# Patient Record
Sex: MALE | Race: ASIAN | NOT HISPANIC OR LATINO | Employment: OTHER | URBAN - METROPOLITAN AREA
[De-identification: names, ages, dates, MRNs, and addresses within clinical notes are randomized per-mention and may not be internally consistent; named-entity substitution may affect disease eponyms.]

---

## 2018-02-06 ENCOUNTER — HOSPITAL ENCOUNTER (OUTPATIENT)
Dept: RADIOLOGY | Facility: HOSPITAL | Age: 48
Discharge: HOME | End: 2018-02-06
Attending: FAMILY MEDICINE
Payer: COMMERCIAL

## 2018-02-06 DIAGNOSIS — M54.50 LOW BACK PAIN: Primary | ICD-10-CM

## 2018-02-06 DIAGNOSIS — M25.511 RIGHT SHOULDER PAIN: ICD-10-CM

## 2018-02-06 DIAGNOSIS — M54.50 LOW BACK PAIN: ICD-10-CM

## 2018-02-06 PROCEDURE — 73030 X-RAY EXAM OF SHOULDER: CPT | Mod: 26,RT,, | Performed by: RADIOLOGY

## 2018-02-06 PROCEDURE — 72100 X-RAY EXAM L-S SPINE 2/3 VWS: CPT | Mod: 26,,, | Performed by: RADIOLOGY

## 2018-02-06 PROCEDURE — 72100 X-RAY EXAM L-S SPINE 2/3 VWS: CPT | Mod: TC,FY

## 2018-02-06 PROCEDURE — 73030 X-RAY EXAM OF SHOULDER: CPT | Mod: TC,FY,RT

## 2020-10-01 ENCOUNTER — TELEPHONE (OUTPATIENT)
Dept: ADMINISTRATIVE | Facility: CLINIC | Age: 50
End: 2020-10-01

## 2023-07-22 ENCOUNTER — HOSPITAL ENCOUNTER (EMERGENCY)
Facility: HOSPITAL | Age: 53
Discharge: HOME | End: 2023-07-22
Attending: EMERGENCY MEDICINE
Payer: COMMERCIAL

## 2023-07-22 VITALS
DIASTOLIC BLOOD PRESSURE: 92 MMHG | HEART RATE: 82 BPM | RESPIRATION RATE: 19 BRPM | OXYGEN SATURATION: 95 % | SYSTOLIC BLOOD PRESSURE: 152 MMHG | WEIGHT: 235 LBS | TEMPERATURE: 98 F

## 2023-07-22 DIAGNOSIS — N20.0 KIDNEY STONE: Primary | ICD-10-CM

## 2023-07-22 DIAGNOSIS — R07.9 CHEST PAIN: ICD-10-CM

## 2023-07-22 LAB
ALBUMIN SERPL BCP-MCNC: 3.7 G/DL (ref 3.5–5.2)
ALP SERPL-CCNC: 65 U/L (ref 55–135)
ALT SERPL W/O P-5'-P-CCNC: 30 U/L (ref 10–44)
ANION GAP SERPL CALC-SCNC: 9 MMOL/L (ref 8–16)
AST SERPL-CCNC: 21 U/L (ref 10–40)
BACTERIA #/AREA URNS HPF: ABNORMAL /HPF
BASOPHILS # BLD AUTO: 0.07 K/UL (ref 0–0.2)
BASOPHILS NFR BLD: 0.6 % (ref 0–1.9)
BILIRUB SERPL-MCNC: 1.1 MG/DL (ref 0.1–1)
BILIRUB UR QL STRIP: NEGATIVE
BUN SERPL-MCNC: 16 MG/DL (ref 6–20)
CALCIUM SERPL-MCNC: 9.2 MG/DL (ref 8.7–10.5)
CHLORIDE SERPL-SCNC: 104 MMOL/L (ref 95–110)
CLARITY UR: CLEAR
CO2 SERPL-SCNC: 25 MMOL/L (ref 23–29)
COLOR UR: YELLOW
CREAT SERPL-MCNC: 1.2 MG/DL (ref 0.5–1.4)
DIFFERENTIAL METHOD: ABNORMAL
EOSINOPHIL # BLD AUTO: 0.7 K/UL (ref 0–0.5)
EOSINOPHIL NFR BLD: 6.4 % (ref 0–8)
ERYTHROCYTE [DISTWIDTH] IN BLOOD BY AUTOMATED COUNT: 12.8 % (ref 11.5–14.5)
EST. GFR  (NO RACE VARIABLE): >60 ML/MIN/1.73 M^2
GLUCOSE SERPL-MCNC: 148 MG/DL (ref 70–110)
GLUCOSE UR QL STRIP: NEGATIVE
HCT VFR BLD AUTO: 41.3 % (ref 40–54)
HGB BLD-MCNC: 14.1 G/DL (ref 14–18)
HGB UR QL STRIP: ABNORMAL
HYALINE CASTS #/AREA URNS LPF: 1 /LPF
IMM GRANULOCYTES # BLD AUTO: 0.04 K/UL (ref 0–0.04)
IMM GRANULOCYTES NFR BLD AUTO: 0.4 % (ref 0–0.5)
KETONES UR QL STRIP: NEGATIVE
LEUKOCYTE ESTERASE UR QL STRIP: ABNORMAL
LYMPHOCYTES # BLD AUTO: 2.5 K/UL (ref 1–4.8)
LYMPHOCYTES NFR BLD: 22.9 % (ref 18–48)
MCH RBC QN AUTO: 31.5 PG (ref 27–31)
MCHC RBC AUTO-ENTMCNC: 34.1 G/DL (ref 32–36)
MCV RBC AUTO: 92 FL (ref 82–98)
MICROSCOPIC COMMENT: ABNORMAL
MONOCYTES # BLD AUTO: 1.1 K/UL (ref 0.3–1)
MONOCYTES NFR BLD: 10.1 % (ref 4–15)
NEUTROPHILS # BLD AUTO: 6.6 K/UL (ref 1.8–7.7)
NEUTROPHILS NFR BLD: 59.6 % (ref 38–73)
NITRITE UR QL STRIP: NEGATIVE
NRBC BLD-RTO: 0 /100 WBC
PH UR STRIP: 6 [PH] (ref 5–8)
PLATELET # BLD AUTO: 216 K/UL (ref 150–450)
PMV BLD AUTO: 11 FL (ref 9.2–12.9)
POTASSIUM SERPL-SCNC: 4.2 MMOL/L (ref 3.5–5.1)
PROT SERPL-MCNC: 7.2 G/DL (ref 6–8.4)
PROT UR QL STRIP: NEGATIVE
RBC # BLD AUTO: 4.48 M/UL (ref 4.6–6.2)
RBC #/AREA URNS HPF: 24 /HPF (ref 0–4)
SODIUM SERPL-SCNC: 138 MMOL/L (ref 136–145)
SP GR UR STRIP: 1.01 (ref 1–1.03)
SQUAMOUS #/AREA URNS HPF: 0 /HPF
TROPONIN I SERPL DL<=0.01 NG/ML-MCNC: <0.006 NG/ML (ref 0–0.03)
URN SPEC COLLECT METH UR: ABNORMAL
UROBILINOGEN UR STRIP-ACNC: NEGATIVE EU/DL
WBC # BLD AUTO: 11.07 K/UL (ref 3.9–12.7)
WBC #/AREA URNS HPF: 10 /HPF (ref 0–5)
YEAST URNS QL MICRO: ABNORMAL

## 2023-07-22 PROCEDURE — 96361 HYDRATE IV INFUSION ADD-ON: CPT

## 2023-07-22 PROCEDURE — 99285 EMERGENCY DEPT VISIT HI MDM: CPT | Mod: 25

## 2023-07-22 PROCEDURE — 25000003 PHARM REV CODE 250: Performed by: EMERGENCY MEDICINE

## 2023-07-22 PROCEDURE — 85025 COMPLETE CBC W/AUTO DIFF WBC: CPT | Performed by: EMERGENCY MEDICINE

## 2023-07-22 PROCEDURE — 80053 COMPREHEN METABOLIC PANEL: CPT | Performed by: EMERGENCY MEDICINE

## 2023-07-22 PROCEDURE — 93005 ELECTROCARDIOGRAM TRACING: CPT

## 2023-07-22 PROCEDURE — 96375 TX/PRO/DX INJ NEW DRUG ADDON: CPT

## 2023-07-22 PROCEDURE — 93010 ELECTROCARDIOGRAM REPORT: CPT | Mod: ,,, | Performed by: INTERNAL MEDICINE

## 2023-07-22 PROCEDURE — 96365 THER/PROPH/DIAG IV INF INIT: CPT

## 2023-07-22 PROCEDURE — 93010 EKG 12-LEAD: ICD-10-PCS | Mod: ,,, | Performed by: INTERNAL MEDICINE

## 2023-07-22 PROCEDURE — 63600175 PHARM REV CODE 636 W HCPCS: Performed by: EMERGENCY MEDICINE

## 2023-07-22 PROCEDURE — 84484 ASSAY OF TROPONIN QUANT: CPT | Performed by: EMERGENCY MEDICINE

## 2023-07-22 PROCEDURE — 81000 URINALYSIS NONAUTO W/SCOPE: CPT | Performed by: STUDENT IN AN ORGANIZED HEALTH CARE EDUCATION/TRAINING PROGRAM

## 2023-07-22 RX ORDER — OXYCODONE AND ACETAMINOPHEN 5; 325 MG/1; MG/1
1 TABLET ORAL EVERY 4 HOURS PRN
Qty: 15 TABLET | Refills: 0 | Status: ON HOLD | OUTPATIENT
Start: 2023-07-22 | End: 2023-08-22 | Stop reason: SDUPTHER

## 2023-07-22 RX ORDER — KETOROLAC TROMETHAMINE 10 MG/1
10 TABLET, FILM COATED ORAL EVERY 6 HOURS
Qty: 20 TABLET | Refills: 0 | Status: SHIPPED | OUTPATIENT
Start: 2023-07-22 | End: 2023-07-27

## 2023-07-22 RX ORDER — ONDANSETRON 2 MG/ML
4 INJECTION INTRAMUSCULAR; INTRAVENOUS
Status: COMPLETED | OUTPATIENT
Start: 2023-07-22 | End: 2023-07-22

## 2023-07-22 RX ORDER — ONDANSETRON 4 MG/1
4 TABLET, ORALLY DISINTEGRATING ORAL EVERY 6 HOURS PRN
Qty: 15 TABLET | Refills: 0 | Status: ON HOLD | OUTPATIENT
Start: 2023-07-22 | End: 2023-08-22 | Stop reason: SDUPTHER

## 2023-07-22 RX ORDER — KETOROLAC TROMETHAMINE 30 MG/ML
10 INJECTION, SOLUTION INTRAMUSCULAR; INTRAVENOUS
Status: COMPLETED | OUTPATIENT
Start: 2023-07-22 | End: 2023-07-22

## 2023-07-22 RX ORDER — TAMSULOSIN HYDROCHLORIDE 0.4 MG/1
0.4 CAPSULE ORAL DAILY
Qty: 10 CAPSULE | Refills: 0 | Status: ON HOLD | OUTPATIENT
Start: 2023-07-22 | End: 2023-08-22 | Stop reason: SDUPTHER

## 2023-07-22 RX ORDER — MORPHINE SULFATE 4 MG/ML
3 INJECTION, SOLUTION INTRAMUSCULAR; INTRAVENOUS
Status: COMPLETED | OUTPATIENT
Start: 2023-07-22 | End: 2023-07-22

## 2023-07-22 RX ORDER — TAMSULOSIN HYDROCHLORIDE 0.4 MG/1
0.4 CAPSULE ORAL
Status: COMPLETED | OUTPATIENT
Start: 2023-07-22 | End: 2023-07-22

## 2023-07-22 RX ADMIN — CEFTRIAXONE SODIUM 1 G: 1 INJECTION, POWDER, FOR SOLUTION INTRAMUSCULAR; INTRAVENOUS at 08:07

## 2023-07-22 RX ADMIN — MORPHINE SULFATE 3 MG: 4 INJECTION INTRAVENOUS at 10:07

## 2023-07-22 RX ADMIN — KETOROLAC TROMETHAMINE 10 MG: 30 INJECTION, SOLUTION INTRAMUSCULAR; INTRAVENOUS at 06:07

## 2023-07-22 RX ADMIN — SODIUM CHLORIDE 1000 ML: 9 INJECTION, SOLUTION INTRAVENOUS at 06:07

## 2023-07-22 RX ADMIN — TAMSULOSIN HYDROCHLORIDE 0.4 MG: 0.4 CAPSULE ORAL at 08:07

## 2023-07-22 RX ADMIN — ONDANSETRON 4 MG: 2 INJECTION INTRAMUSCULAR; INTRAVENOUS at 10:07

## 2023-07-22 NOTE — ED NOTES
Patient identifiers for Tavo Chadwick checked and correct.  LOC: The patient is awake, alert and aware of environment with an appropriate affect, the patient is oriented x 3 and speaking appropriately.  APPEARANCE: Patient resting quietly and in no acute distress, patient is clean and well groomed, patient's clothing are properly fastened.  SKIN: The skin is warm and dry, patient has normal skin turgor and moist mucus membranes.  MUSKULOSKELETAL: Patient moving all extremities well, no obvious swelling or deformities noted.  RESPIRATORY: Airway is open and patent, respirations are spontaneous, patient has a normal effort and rate.  ABDOMEN: Non tender to palpation.

## 2023-07-22 NOTE — ED PROVIDER NOTES
Encounter Date: 7/22/2023       History     Chief Complaint   Patient presents with    Flank Pain     Patient reports left flank pain for 10 days that is constant but temporarily relived by ibuprofen. He also reports intermittent burning during urination. Denies fever, hematuria, n/v, diarrhea, constipation. Ibuprofen 800mg taken at 1am.      Patient is a 53-year-old male who complains of left-sided flank pain over the past 10 days.  He denies hematuria or dysuria.  No nausea or vomiting.  No fever.  Patient has been taking ibuprofen for his pain, which he says relieves it for few hours.  He has no prior history of kidney stones.    Review of patient's allergies indicates:  No Known Allergies  No past medical history on file.  No past surgical history on file.  No family history on file.     Review of Systems   Constitutional:  Negative for chills and fever.   Gastrointestinal:  Negative for nausea and vomiting.   Genitourinary:  Positive for flank pain. Negative for dysuria and hematuria.     Physical Exam     Initial Vitals [07/22/23 0528]   BP Pulse Resp Temp SpO2   (!) 165/95 86 18 98.4 °F (36.9 °C) 98 %      MAP       --         Physical Exam    Nursing note and vitals reviewed.  Constitutional: No distress.   HENT:   Head: Atraumatic.   Cardiovascular:  Normal rate, regular rhythm and normal heart sounds.           Pulmonary/Chest: Breath sounds normal.   Abdominal: Abdomen is soft. Bowel sounds are normal.   There is mild tenderness of the left lower quadrant without guarding or rebound.   Musculoskeletal:         General: Normal range of motion.      Comments: Left CVA tenderness.     Neurological: He is alert and oriented to person, place, and time.   Skin: Skin is warm and dry. No rash noted.       ED Course   Procedures  Labs Reviewed   URINALYSIS, REFLEX TO URINE CULTURE - Abnormal; Notable for the following components:       Result Value    Occult Blood UA 1+ (*)     Leukocytes, UA Trace (*)     All  other components within normal limits    Narrative:     Specimen Source->Urine   URINALYSIS MICROSCOPIC - Abnormal; Notable for the following components:    RBC, UA 24 (*)     WBC, UA 10 (*)     Yeast, UA Rare (*)     All other components within normal limits    Narrative:     Specimen Source->Urine   CBC W/ AUTO DIFFERENTIAL - Abnormal; Notable for the following components:    RBC 4.48 (*)     MCH 31.5 (*)     Mono # 1.1 (*)     Eos # 0.7 (*)     All other components within normal limits   COMPREHENSIVE METABOLIC PANEL - Abnormal; Notable for the following components:    Glucose 148 (*)     Total Bilirubin 1.1 (*)     All other components within normal limits   TROPONIN I          Imaging Results              X-Ray Chest 1 View (Final result)  Result time 07/22/23 09:24:13      Final result by Sai Vazquez MD (07/22/23 09:24:13)                   Impression:      No acute findings.      Electronically signed by: Sai Vazquez MD  Date:    07/22/2023  Time:    09:24               Narrative:    EXAMINATION:  XR CHEST 1 VIEW    CLINICAL HISTORY:  chest pain;    TECHNIQUE:  Single frontal view of the chest was performed.    COMPARISON:  05/06/2015    FINDINGS:  Cardiomediastinal contour is within normal limits.Mild interstitial thickening left lower lung, stable.  No pneumothorax.No pleural effusions.                                       CT Renal Stone Study ABD Pelvis WO (Final result)  Result time 07/22/23 08:30:52      Final result by Sai Vazquez MD (07/22/23 08:30:52)                   Impression:      4 mm stone lodged at the left UVJ with mild left-sided hydronephrosis.    Staghorn type calculus (2.7 cm) in the right renal pelvis.      Electronically signed by: Sai Vazquez MD  Date:    07/22/2023  Time:    08:30               Narrative:    EXAMINATION:  CT RENAL STONE STUDY ABD PELVIS WO    CLINICAL HISTORY:  Flank pain, kidney stone suspected;    TECHNIQUE:  Low dose axial images, sagittal and coronal  reformations were obtained from the lung bases to the pubic symphysis.  Contrast was not administered.    COMPARISON:  None    FINDINGS:  Kidneys: Large staghorn type stone the right renal pelvis measuring 2.7 cm (series 601, image 91).  Multiple additional right lower pole stones.  No right-sided hydronephrosis.  There is mild left-sided hydronephrosis.  There is a 4 mm stone at the left UVJ.    Pelvis: The bladder and prostate are unremarkable.  No fluid collections.  No inguinal or pelvic lymphadenopathy.    Abdomen: Hepatomegaly noted measuring 23.9 cm (series 601, image 84).  No liver masses, noting limited assessment without IV contrast.  Gallbladder is unremarkable.  No biliary or pancreatic ductal dilatation.  Borderline splenomegaly measuring 12.7 cm (series 2, image 47).  The adrenal glands are unremarkable.  The abdominal aorta tapers normally.  No periaortic lymphadenopathy.    Bowel: The terminal ileum and appendix are unremarkable.  No dilated loops of bowel.  There is mild fat stranding increased number of small lymph nodes in the mesentery.    Bones: Prominent bilateral degenerative changes of the hips.  No marrow replacement process.    Lung bases: Mild dependent interstitial thickening.                                       Medications   ketorolac injection 9.999 mg (9.999 mg Intravenous Given 7/22/23 0632)   sodium chloride 0.9% bolus 1,000 mL 1,000 mL (0 mLs Intravenous Stopped 7/22/23 0733)   tamsulosin 24 hr capsule 0.4 mg (0.4 mg Oral Given 7/22/23 0844)   cefTRIAXone (ROCEPHIN) 1 g in dextrose 5 % in water (D5W) 5 % 100 mL IVPB (MB+) (0 g Intravenous Stopped 7/22/23 0914)   morphine injection 3 mg (3 mg Intravenous Given 7/22/23 1009)   ondansetron injection 4 mg (4 mg Intravenous Given 7/22/23 1009)     Medical Decision Making:   Initial Assessment:   53-year-old male with left flank pain.  Basic lab work and urinalysis ordered.  Renal CT will also be ordered.  Differential Diagnosis:    Muscle strain, urinary tract infection, pyelonephritis, kidney stone, diverticulitis.  Clinical Tests:   Lab Tests: Ordered and Reviewed  The following lab test(s) were unremarkable: CBC and CMP       <> Summary of Lab: Urinalysis with RBC 24, nitrite negative.  ED Management:  Renal CT shows a 4 mm stone at the left UVJ with mild hydronephrosis.  Patient was given IV fluids, Toradol and morphine here in the emergency department.  He obtain complete pain relief and has had no nausea or vomiting.  CBC shows a normal white blood cell count and the patient is afebrile.  I have placed an urgent ambulatory referral to Urology for close follow-up and will send him home with prescriptions for Flomax, Toradol, Zofran and Percocet.  He should return to the emergency department for any intractable pain, intractable vomiting, fever or any other urgent concerns.                        Clinical Impression:   Final diagnoses:  [R07.9] Chest pain  [N20.0] Kidney stone (Primary)        ED Disposition Condition    Discharge Stable          ED Prescriptions       Medication Sig Dispense Start Date End Date Auth. Provider    ondansetron (ZOFRAN-ODT) 4 MG TbDL Take 1 tablet (4 mg total) by mouth every 6 (six) hours as needed (Nausea or vomiting). 15 tablet 7/22/2023 -- Rickie Good MD    ketorolac (TORADOL) 10 mg tablet Take 1 tablet (10 mg total) by mouth every 6 (six) hours. for 5 days 20 tablet 7/22/2023 7/27/2023 Rickie Good MD    tamsulosin (FLOMAX) 0.4 mg Cap Take 1 capsule (0.4 mg total) by mouth once daily. 10 capsule 7/22/2023 -- Rickie Good MD    oxyCODONE-acetaminophen (PERCOCET) 5-325 mg per tablet Take 1 tablet by mouth every 4 (four) hours as needed for Pain. 15 tablet 7/22/2023 -- Rickie Good MD          Follow-up Information       Follow up With Specialties Details Why Contact Info    Urologist  Schedule an appointment as soon as possible for a visit       [facility]  Emergency Dept Emergency Medicine  If symptoms worsen [address] [place] [id number]  [phone]             Rickie Good MD  07/22/23 1512

## 2023-07-22 NOTE — ED NOTES
Report received. Assumed care of patient. Pt returns from CT scan. Denies flank pain at this time. Pt reporting new onset chest pain that he describes as pressure. EKG performed.

## 2023-07-22 NOTE — ED NOTES
Called into room, pt continues to report left sided chest pressure. MD informed. Troponin to be drawn.

## 2023-08-08 NOTE — PROGRESS NOTES
Subjective:       Patient ID: Tavo Chadwick is a 53 y.o. male.    Chief Complaint: Flank Pain     This is a 53 y.o.  male patient that is new to me.  The patient was referred to me by Dr. Good/ED for left UVJ stone and right large stone.  CT 7/22/23 showed left 4mm UVJ stone with mild hydronephrosis, 2.7cm right renal pelvis stone.   Further left-sided pain, did not see stone pass.  He has dull right-sided pain occasionally.  No history of stones.  No blood thinners.      Lab Results   Component Value Date    CREATININE 1.2 07/22/2023       ---  PMH/PSH/Medications/Allergies/Social history reviewed and as in chart.    Review of Systems   Constitutional:  Negative for activity change, chills and fever.   HENT:  Negative for congestion.    Respiratory:  Negative for cough, chest tightness and shortness of breath.    Cardiovascular:  Negative for chest pain and palpitations.   Gastrointestinal:  Negative for abdominal distention, abdominal pain, nausea and vomiting.   Genitourinary:  Positive for flank pain. Negative for difficulty urinating, hematuria, penile pain, scrotal swelling and testicular pain.   Musculoskeletal:  Negative for gait problem.       Objective:      Physical Exam  HENT:      Head: Atraumatic.   Pulmonary:      Effort: Pulmonary effort is normal.   Neurological:      General: No focal deficit present.      Mental Status: He is alert and oriented to person, place, and time.           Results for orders placed or performed during the hospital encounter of 07/22/23 (from the past 2160 hour(s))   CT Renal Stone Study ABD Pelvis WO    Narrative    EXAMINATION:  CT RENAL STONE STUDY ABD PELVIS WO    CLINICAL HISTORY:  Flank pain, kidney stone suspected;    TECHNIQUE:  Low dose axial images, sagittal and coronal reformations were obtained from the lung bases to the pubic symphysis.  Contrast was not administered.    COMPARISON:  None    FINDINGS:  Kidneys: Large staghorn type stone the right renal pelvis  measuring 2.7 cm (series 601, image 91).  Multiple additional right lower pole stones.  No right-sided hydronephrosis.  There is mild left-sided hydronephrosis.  There is a 4 mm stone at the left UVJ.    Pelvis: The bladder and prostate are unremarkable.  No fluid collections.  No inguinal or pelvic lymphadenopathy.    Abdomen: Hepatomegaly noted measuring 23.9 cm (series 601, image 84).  No liver masses, noting limited assessment without IV contrast.  Gallbladder is unremarkable.  No biliary or pancreatic ductal dilatation.  Borderline splenomegaly measuring 12.7 cm (series 2, image 47).  The adrenal glands are unremarkable.  The abdominal aorta tapers normally.  No periaortic lymphadenopathy.    Bowel: The terminal ileum and appendix are unremarkable.  No dilated loops of bowel.  There is mild fat stranding increased number of small lymph nodes in the mesentery.    Bones: Prominent bilateral degenerative changes of the hips.  No marrow replacement process.    Lung bases: Mild dependent interstitial thickening.      Impression    4 mm stone lodged at the left UVJ with mild left-sided hydronephrosis.    Staghorn type calculus (2.7 cm) in the right renal pelvis.      Electronically signed by: Sai Vazquez MD  Date:    07/22/2023  Time:    08:30       Assessment:     Problem Noted   Kidney Stones 8/9/2023    Left 4 mm UVJ stone with hydronephrosis, right 2.7 cm renal pelvis and right nonobstructing stones on CT 07/22/2023         Plan:     Check CT later this week to ensure left-sided ureteral stone has passed.  Separately discussed the findings of large right renal pelvis and nonobstructing stones on the right kidney.  Given the size and stone recommend right percutaneous nephrolithotomy.  Discussed alternatives of serial ureteroscopy or sandwich therapy with ureteroscopy and shockwave lithotripsy which are not great options given stone burden.  He wished to proceed with right percutaneous nephrolithotomy.  Will  request IR access prior to the procedure.  Plan for procedure 08/21/2023.  Urine culture  If left ureteral stone is still present we will do left ureteroscopy at the time of percutaneous nephrolithotomy.    I have explained the indication, risks, benefits, and alternatives of the procedure in detail.  The patient voices understanding and all questions have been answered.   Risks including but not limited to bleeding, infection, injury to the lung, liver, spleen, colon, need for additional procedures, incomplete removal of stone, arteriovenous malformation, pseudoaneurysm, hydrothorax or pneumothorax, urinoma, ureteral injury or perforation, DVT, PE, heart attack, stroke, and death were discussed with the patient in depth.  The patient agrees to proceed as planned with right PCNL.     Thong Ivey MD    The above referenced imaging and interpretations were personally reviewed.  Disclaimer: This note has been generated using voice-recognition software. There may be typographical errors that have been missed during proof-reading.

## 2023-08-09 ENCOUNTER — OFFICE VISIT (OUTPATIENT)
Dept: UROLOGY | Facility: CLINIC | Age: 53
End: 2023-08-09
Payer: COMMERCIAL

## 2023-08-09 VITALS
SYSTOLIC BLOOD PRESSURE: 140 MMHG | WEIGHT: 232.5 LBS | DIASTOLIC BLOOD PRESSURE: 87 MMHG | BODY MASS INDEX: 37.37 KG/M2 | HEART RATE: 76 BPM | HEIGHT: 66 IN

## 2023-08-09 DIAGNOSIS — N20.0 KIDNEY STONES: Primary | ICD-10-CM

## 2023-08-09 LAB
BILIRUB SERPL-MCNC: ABNORMAL MG/DL
BLOOD URINE, POC: ABNORMAL
CLARITY, POC UA: CLEAR
COLOR, POC UA: YELLOW
GLUCOSE UR QL STRIP: NORMAL
KETONES UR QL STRIP: ABNORMAL
LEUKOCYTE ESTERASE URINE, POC: ABNORMAL
NITRITE, POC UA: ABNORMAL
PH, POC UA: 6
PROTEIN, POC: ABNORMAL
SPECIFIC GRAVITY, POC UA: 1.02
UROBILINOGEN, POC UA: NORMAL

## 2023-08-09 PROCEDURE — 3079F DIAST BP 80-89 MM HG: CPT | Mod: CPTII,S$GLB,, | Performed by: UROLOGY

## 2023-08-09 PROCEDURE — 99999PBSHW POCT URINE DIPSTICK WITHOUT MICROSCOPE: Mod: PBBFAC,,,

## 2023-08-09 PROCEDURE — 3008F BODY MASS INDEX DOCD: CPT | Mod: CPTII,S$GLB,, | Performed by: UROLOGY

## 2023-08-09 PROCEDURE — 1159F MED LIST DOCD IN RCRD: CPT | Mod: CPTII,S$GLB,, | Performed by: UROLOGY

## 2023-08-09 PROCEDURE — 3008F PR BODY MASS INDEX (BMI) DOCUMENTED: ICD-10-PCS | Mod: CPTII,S$GLB,, | Performed by: UROLOGY

## 2023-08-09 PROCEDURE — 1160F PR REVIEW ALL MEDS BY PRESCRIBER/CLIN PHARMACIST DOCUMENTED: ICD-10-PCS | Mod: CPTII,S$GLB,, | Performed by: UROLOGY

## 2023-08-09 PROCEDURE — 81002 URINALYSIS NONAUTO W/O SCOPE: CPT | Mod: PBBFAC,PO | Performed by: UROLOGY

## 2023-08-09 PROCEDURE — 86900 BLOOD TYPING SEROLOGIC ABO: CPT | Performed by: UROLOGY

## 2023-08-09 PROCEDURE — 3077F SYST BP >= 140 MM HG: CPT | Mod: CPTII,S$GLB,, | Performed by: UROLOGY

## 2023-08-09 PROCEDURE — 3077F PR MOST RECENT SYSTOLIC BLOOD PRESSURE >= 140 MM HG: ICD-10-PCS | Mod: CPTII,S$GLB,, | Performed by: UROLOGY

## 2023-08-09 PROCEDURE — 99999PBSHW POCT URINE DIPSTICK WITHOUT MICROSCOPE: ICD-10-PCS | Mod: PBBFAC,,,

## 2023-08-09 PROCEDURE — 3079F PR MOST RECENT DIASTOLIC BLOOD PRESSURE 80-89 MM HG: ICD-10-PCS | Mod: CPTII,S$GLB,, | Performed by: UROLOGY

## 2023-08-09 PROCEDURE — 99999 PR PBB SHADOW E&M-EST. PATIENT-LVL V: ICD-10-PCS | Mod: PBBFAC,,, | Performed by: UROLOGY

## 2023-08-09 PROCEDURE — 99214 OFFICE O/P EST MOD 30 MIN: CPT | Mod: S$GLB,,, | Performed by: UROLOGY

## 2023-08-09 PROCEDURE — 99999 PR PBB SHADOW E&M-EST. PATIENT-LVL V: CPT | Mod: PBBFAC,,, | Performed by: UROLOGY

## 2023-08-09 PROCEDURE — 1160F RVW MEDS BY RX/DR IN RCRD: CPT | Mod: CPTII,S$GLB,, | Performed by: UROLOGY

## 2023-08-09 PROCEDURE — 99214 PR OFFICE/OUTPT VISIT, EST, LEVL IV, 30-39 MIN: ICD-10-PCS | Mod: S$GLB,,, | Performed by: UROLOGY

## 2023-08-09 PROCEDURE — 87086 URINE CULTURE/COLONY COUNT: CPT | Performed by: UROLOGY

## 2023-08-09 PROCEDURE — 1159F PR MEDICATION LIST DOCUMENTED IN MEDICAL RECORD: ICD-10-PCS | Mod: CPTII,S$GLB,, | Performed by: UROLOGY

## 2023-08-09 RX ORDER — LIDOCAINE HYDROCHLORIDE 10 MG/ML
1 INJECTION, SOLUTION EPIDURAL; INFILTRATION; INTRACAUDAL; PERINEURAL ONCE
Status: CANCELLED | OUTPATIENT
Start: 2023-08-09 | End: 2023-08-09

## 2023-08-09 RX ORDER — CEFAZOLIN SODIUM 2 G/50ML
2 SOLUTION INTRAVENOUS
Status: CANCELLED | OUTPATIENT
Start: 2023-08-09

## 2023-08-09 RX ORDER — ACETAMINOPHEN 500 MG
1000 TABLET ORAL
Status: CANCELLED | OUTPATIENT
Start: 2023-08-09

## 2023-08-10 ENCOUNTER — HOSPITAL ENCOUNTER (OUTPATIENT)
Dept: RADIOLOGY | Facility: HOSPITAL | Age: 53
Discharge: HOME | End: 2023-08-10
Attending: UROLOGY
Payer: COMMERCIAL

## 2023-08-10 DIAGNOSIS — N20.0 KIDNEY STONES: ICD-10-CM

## 2023-08-10 LAB — BACTERIA UR CULT: NO GROWTH

## 2023-08-10 PROCEDURE — 74176 CT ABD & PELVIS W/O CONTRAST: CPT | Mod: 26,,, | Performed by: RADIOLOGY

## 2023-08-10 PROCEDURE — 74176 CT ABDOMEN PELVIS WITHOUT CONTRAST: ICD-10-PCS | Mod: 26,,, | Performed by: RADIOLOGY

## 2023-08-10 PROCEDURE — 74176 CT ABD & PELVIS W/O CONTRAST: CPT | Mod: TC

## 2023-08-15 DIAGNOSIS — N20.0 KIDNEY STONES: Primary | ICD-10-CM

## 2023-08-20 ENCOUNTER — ANESTHESIA EVENT (OUTPATIENT)
Dept: SURGERY | Facility: HOSPITAL | Age: 53
DRG: 661 | End: 2023-08-20
Payer: COMMERCIAL

## 2023-08-21 ENCOUNTER — HOSPITAL ENCOUNTER (OUTPATIENT)
Dept: INTERVENTIONAL RADIOLOGY/VASCULAR | Facility: HOSPITAL | Age: 53
Discharge: HOME | DRG: 661 | End: 2023-08-21
Attending: UROLOGY | Admitting: UROLOGY
Payer: COMMERCIAL

## 2023-08-21 ENCOUNTER — ANESTHESIA (OUTPATIENT)
Dept: SURGERY | Facility: HOSPITAL | Age: 53
DRG: 661 | End: 2023-08-21
Payer: COMMERCIAL

## 2023-08-21 ENCOUNTER — HOSPITAL ENCOUNTER (INPATIENT)
Facility: HOSPITAL | Age: 53
LOS: 2 days | Discharge: HOME | DRG: 661 | End: 2023-08-23
Attending: UROLOGY | Admitting: UROLOGY
Payer: COMMERCIAL

## 2023-08-21 VITALS
TEMPERATURE: 98 F | HEIGHT: 71 IN | HEART RATE: 72 BPM | OXYGEN SATURATION: 96 % | SYSTOLIC BLOOD PRESSURE: 137 MMHG | WEIGHT: 232.38 LBS | DIASTOLIC BLOOD PRESSURE: 91 MMHG | BODY MASS INDEX: 32.53 KG/M2 | RESPIRATION RATE: 16 BRPM

## 2023-08-21 DIAGNOSIS — N20.0 KIDNEY STONES: Primary | ICD-10-CM

## 2023-08-21 DIAGNOSIS — N20.0 KIDNEY STONE: ICD-10-CM

## 2023-08-21 LAB
ANION GAP SERPL CALC-SCNC: 9 MMOL/L (ref 8–16)
BASOPHILS # BLD AUTO: 0.05 K/UL (ref 0–0.2)
BASOPHILS NFR BLD: 0.7 % (ref 0–1.9)
BUN SERPL-MCNC: 14 MG/DL (ref 6–20)
CALCIUM SERPL-MCNC: 9.3 MG/DL (ref 8.7–10.5)
CHLORIDE SERPL-SCNC: 106 MMOL/L (ref 95–110)
CO2 SERPL-SCNC: 25 MMOL/L (ref 23–29)
CREAT SERPL-MCNC: 0.9 MG/DL (ref 0.5–1.4)
DIFFERENTIAL METHOD: ABNORMAL
EOSINOPHIL # BLD AUTO: 0.6 K/UL (ref 0–0.5)
EOSINOPHIL NFR BLD: 8.3 % (ref 0–8)
ERYTHROCYTE [DISTWIDTH] IN BLOOD BY AUTOMATED COUNT: 13 % (ref 11.5–14.5)
EST. GFR  (NO RACE VARIABLE): >60 ML/MIN/1.73 M^2
GLUCOSE SERPL-MCNC: 126 MG/DL (ref 70–110)
HCT VFR BLD AUTO: 40.8 % (ref 40–54)
HGB BLD-MCNC: 14.1 G/DL (ref 14–18)
IMM GRANULOCYTES # BLD AUTO: 0.03 K/UL (ref 0–0.04)
IMM GRANULOCYTES NFR BLD AUTO: 0.4 % (ref 0–0.5)
INR PPP: 1 (ref 0.8–1.2)
LYMPHOCYTES # BLD AUTO: 2.6 K/UL (ref 1–4.8)
LYMPHOCYTES NFR BLD: 34.2 % (ref 18–48)
MCH RBC QN AUTO: 31.7 PG (ref 27–31)
MCHC RBC AUTO-ENTMCNC: 34.6 G/DL (ref 32–36)
MCV RBC AUTO: 92 FL (ref 82–98)
MONOCYTES # BLD AUTO: 0.7 K/UL (ref 0.3–1)
MONOCYTES NFR BLD: 8.5 % (ref 4–15)
NEUTROPHILS # BLD AUTO: 3.7 K/UL (ref 1.8–7.7)
NEUTROPHILS NFR BLD: 47.9 % (ref 38–73)
NRBC BLD-RTO: 0 /100 WBC
PLATELET # BLD AUTO: 219 K/UL (ref 150–450)
PMV BLD AUTO: 10.7 FL (ref 9.2–12.9)
POTASSIUM SERPL-SCNC: 3.8 MMOL/L (ref 3.5–5.1)
PROTHROMBIN TIME: 11.4 SEC (ref 9–12.5)
RBC # BLD AUTO: 4.45 M/UL (ref 4.6–6.2)
SODIUM SERPL-SCNC: 140 MMOL/L (ref 136–145)
WBC # BLD AUTO: 7.68 K/UL (ref 3.9–12.7)

## 2023-08-21 PROCEDURE — 36415 COLL VENOUS BLD VENIPUNCTURE: CPT | Performed by: UROLOGY

## 2023-08-21 PROCEDURE — 50695 PLMT URETERAL STENT PRQ: CPT | Performed by: RADIOLOGY

## 2023-08-21 PROCEDURE — 63600175 PHARM REV CODE 636 W HCPCS: Performed by: UROLOGY

## 2023-08-21 PROCEDURE — C2627 CATH, SUPRAPUBIC/CYSTOSCOPIC: HCPCS

## 2023-08-21 PROCEDURE — 85025 COMPLETE CBC W/AUTO DIFF WBC: CPT | Performed by: UROLOGY

## 2023-08-21 PROCEDURE — D9220A PRA ANESTHESIA: ICD-10-PCS | Mod: ANES,,, | Performed by: STUDENT IN AN ORGANIZED HEALTH CARE EDUCATION/TRAINING PROGRAM

## 2023-08-21 PROCEDURE — 25000003 PHARM REV CODE 250: Performed by: UROLOGY

## 2023-08-21 PROCEDURE — 25000003 PHARM REV CODE 250: Performed by: STUDENT IN AN ORGANIZED HEALTH CARE EDUCATION/TRAINING PROGRAM

## 2023-08-21 PROCEDURE — 99153 MOD SED SAME PHYS/QHP EA: CPT | Performed by: RADIOLOGY

## 2023-08-21 PROCEDURE — 99152 MOD SED SAME PHYS/QHP 5/>YRS: CPT | Mod: ,,, | Performed by: RADIOLOGY

## 2023-08-21 PROCEDURE — 36000709 HC OR TIME LEV III EA ADD 15 MIN: Performed by: UROLOGY

## 2023-08-21 PROCEDURE — 71000033 HC RECOVERY, INTIAL HOUR: Performed by: UROLOGY

## 2023-08-21 PROCEDURE — C1769 GUIDE WIRE: HCPCS | Performed by: UROLOGY

## 2023-08-21 PROCEDURE — 50081 PERQ NL/PL LITHOTRP CPLX>2CM: CPT | Mod: RT,,, | Performed by: UROLOGY

## 2023-08-21 PROCEDURE — 25000003 PHARM REV CODE 250: Performed by: NURSE ANESTHETIST, CERTIFIED REGISTERED

## 2023-08-21 PROCEDURE — 63600175 PHARM REV CODE 636 W HCPCS: Performed by: NURSE ANESTHETIST, CERTIFIED REGISTERED

## 2023-08-21 PROCEDURE — 63600175 PHARM REV CODE 636 W HCPCS: Performed by: RADIOLOGY

## 2023-08-21 PROCEDURE — 99152 PR MOD CONSCIOUS SEDATION, SAME PHYS, 5+ YRS, FIRST 15 MIN: ICD-10-PCS | Mod: ,,, | Performed by: RADIOLOGY

## 2023-08-21 PROCEDURE — 94799 UNLISTED PULMONARY SVC/PX: CPT

## 2023-08-21 PROCEDURE — A4550 SURGICAL TRAYS: HCPCS

## 2023-08-21 PROCEDURE — 99152 MOD SED SAME PHYS/QHP 5/>YRS: CPT | Performed by: RADIOLOGY

## 2023-08-21 PROCEDURE — 27201423 OPTIME MED/SURG SUP & DEVICES STERILE SUPPLY: Performed by: UROLOGY

## 2023-08-21 PROCEDURE — 36000708 HC OR TIME LEV III 1ST 15 MIN: Performed by: UROLOGY

## 2023-08-21 PROCEDURE — 80048 BASIC METABOLIC PNL TOTAL CA: CPT | Performed by: UROLOGY

## 2023-08-21 PROCEDURE — D9220A PRA ANESTHESIA: ICD-10-PCS | Mod: CRNA,,, | Performed by: NURSE ANESTHETIST, CERTIFIED REGISTERED

## 2023-08-21 PROCEDURE — 25000003 PHARM REV CODE 250: Performed by: PHYSICIAN ASSISTANT

## 2023-08-21 PROCEDURE — 71000039 HC RECOVERY, EACH ADD'L HOUR: Performed by: UROLOGY

## 2023-08-21 PROCEDURE — 99152 MOD SED SAME PHYS/QHP 5/>YRS: CPT

## 2023-08-21 PROCEDURE — 37000008 HC ANESTHESIA 1ST 15 MINUTES: Performed by: UROLOGY

## 2023-08-21 PROCEDURE — 25000003 PHARM REV CODE 250: Performed by: RADIOLOGY

## 2023-08-21 PROCEDURE — 11000001 HC ACUTE MED/SURG PRIVATE ROOM

## 2023-08-21 PROCEDURE — C1729 CATH, DRAINAGE: HCPCS | Performed by: UROLOGY

## 2023-08-21 PROCEDURE — 85610 PROTHROMBIN TIME: CPT | Performed by: UROLOGY

## 2023-08-21 PROCEDURE — 25500020 PHARM REV CODE 255: Performed by: UROLOGY

## 2023-08-21 PROCEDURE — 50695 IR NEPHROSTOMY TUBE PLACEMENT: ICD-10-PCS | Mod: RT,,, | Performed by: RADIOLOGY

## 2023-08-21 PROCEDURE — 99153 MOD SED SAME PHYS/QHP EA: CPT

## 2023-08-21 PROCEDURE — D9220A PRA ANESTHESIA: Mod: ANES,,, | Performed by: STUDENT IN AN ORGANIZED HEALTH CARE EDUCATION/TRAINING PROGRAM

## 2023-08-21 PROCEDURE — C1726 CATH, BAL DIL, NON-VASCULAR: HCPCS | Performed by: UROLOGY

## 2023-08-21 PROCEDURE — 37000009 HC ANESTHESIA EA ADD 15 MINS: Performed by: UROLOGY

## 2023-08-21 PROCEDURE — D9220A PRA ANESTHESIA: Mod: CRNA,,, | Performed by: NURSE ANESTHETIST, CERTIFIED REGISTERED

## 2023-08-21 PROCEDURE — 50081 PR REMV KID STONE, 2+ CM, PERC: ICD-10-PCS | Mod: RT,,, | Performed by: UROLOGY

## 2023-08-21 PROCEDURE — 63600175 PHARM REV CODE 636 W HCPCS: Performed by: PHYSICIAN ASSISTANT

## 2023-08-21 PROCEDURE — 82365 CALCULUS SPECTROSCOPY: CPT | Performed by: UROLOGY

## 2023-08-21 PROCEDURE — C1758 CATHETER, URETERAL: HCPCS | Performed by: UROLOGY

## 2023-08-21 PROCEDURE — C2617 STENT, NON-COR, TEM W/O DEL: HCPCS | Performed by: UROLOGY

## 2023-08-21 DEVICE — STENT URETERAL UNIV 6FR 26CM: Type: IMPLANTABLE DEVICE | Site: URETER | Status: FUNCTIONAL

## 2023-08-21 RX ORDER — OXYCODONE HYDROCHLORIDE 5 MG/1
10 TABLET ORAL EVERY 4 HOURS PRN
Status: DISCONTINUED | OUTPATIENT
Start: 2023-08-21 | End: 2023-08-23 | Stop reason: HOSPADM

## 2023-08-21 RX ORDER — MIDAZOLAM HYDROCHLORIDE 1 MG/ML
INJECTION INTRAMUSCULAR; INTRAVENOUS
Status: DISCONTINUED | OUTPATIENT
Start: 2023-08-21 | End: 2023-08-21

## 2023-08-21 RX ORDER — DOCUSATE SODIUM 100 MG/1
100 CAPSULE, LIQUID FILLED ORAL 2 TIMES DAILY
Status: DISCONTINUED | OUTPATIENT
Start: 2023-08-21 | End: 2023-08-23 | Stop reason: HOSPADM

## 2023-08-21 RX ORDER — DEXAMETHASONE SODIUM PHOSPHATE 4 MG/ML
INJECTION, SOLUTION INTRA-ARTICULAR; INTRALESIONAL; INTRAMUSCULAR; INTRAVENOUS; SOFT TISSUE
Status: DISCONTINUED | OUTPATIENT
Start: 2023-08-21 | End: 2023-08-21

## 2023-08-21 RX ORDER — METOPROLOL TARTRATE 1 MG/ML
5 INJECTION, SOLUTION INTRAVENOUS EVERY 6 HOURS PRN
Status: DISCONTINUED | OUTPATIENT
Start: 2023-08-21 | End: 2023-08-23 | Stop reason: HOSPADM

## 2023-08-21 RX ORDER — MORPHINE SULFATE 4 MG/ML
4 INJECTION, SOLUTION INTRAMUSCULAR; INTRAVENOUS EVERY 4 HOURS PRN
Status: DISCONTINUED | OUTPATIENT
Start: 2023-08-21 | End: 2023-08-22

## 2023-08-21 RX ORDER — CEFAZOLIN SODIUM 2 G/50ML
2 SOLUTION INTRAVENOUS
Status: COMPLETED | OUTPATIENT
Start: 2023-08-21 | End: 2023-08-21

## 2023-08-21 RX ORDER — ONDANSETRON HYDROCHLORIDE 2 MG/ML
INJECTION, SOLUTION INTRAMUSCULAR; INTRAVENOUS
Status: DISCONTINUED | OUTPATIENT
Start: 2023-08-21 | End: 2023-08-21

## 2023-08-21 RX ORDER — SODIUM CHLORIDE 9 MG/ML
INJECTION, SOLUTION INTRAVENOUS CONTINUOUS
Status: DISCONTINUED | OUTPATIENT
Start: 2023-08-21 | End: 2023-08-22 | Stop reason: HOSPADM

## 2023-08-21 RX ORDER — SODIUM CHLORIDE 0.9 % (FLUSH) 0.9 %
3 SYRINGE (ML) INJECTION EVERY 6 HOURS PRN
Status: DISCONTINUED | OUTPATIENT
Start: 2023-08-21 | End: 2023-08-23 | Stop reason: HOSPADM

## 2023-08-21 RX ORDER — MIDAZOLAM HYDROCHLORIDE 1 MG/ML
INJECTION INTRAMUSCULAR; INTRAVENOUS
Status: DISCONTINUED | OUTPATIENT
Start: 2023-08-21 | End: 2023-08-22 | Stop reason: HOSPADM

## 2023-08-21 RX ORDER — FENTANYL CITRATE 50 UG/ML
INJECTION, SOLUTION INTRAMUSCULAR; INTRAVENOUS
Status: DISCONTINUED | OUTPATIENT
Start: 2023-08-21 | End: 2023-08-21

## 2023-08-21 RX ORDER — ROCURONIUM BROMIDE 10 MG/ML
INJECTION, SOLUTION INTRAVENOUS
Status: DISCONTINUED | OUTPATIENT
Start: 2023-08-21 | End: 2023-08-21

## 2023-08-21 RX ORDER — ACETAMINOPHEN 10 MG/ML
INJECTION, SOLUTION INTRAVENOUS
Status: DISCONTINUED | OUTPATIENT
Start: 2023-08-21 | End: 2023-08-21

## 2023-08-21 RX ORDER — PROCHLORPERAZINE EDISYLATE 5 MG/ML
5 INJECTION INTRAMUSCULAR; INTRAVENOUS EVERY 30 MIN PRN
Status: DISCONTINUED | OUTPATIENT
Start: 2023-08-21 | End: 2023-08-21 | Stop reason: HOSPADM

## 2023-08-21 RX ORDER — HYDRALAZINE HYDROCHLORIDE 20 MG/ML
10 INJECTION INTRAMUSCULAR; INTRAVENOUS EVERY 6 HOURS PRN
Status: DISCONTINUED | OUTPATIENT
Start: 2023-08-21 | End: 2023-08-23 | Stop reason: HOSPADM

## 2023-08-21 RX ORDER — FENTANYL CITRATE 50 UG/ML
INJECTION, SOLUTION INTRAMUSCULAR; INTRAVENOUS
Status: DISCONTINUED | OUTPATIENT
Start: 2023-08-21 | End: 2023-08-22 | Stop reason: HOSPADM

## 2023-08-21 RX ORDER — TAMSULOSIN HYDROCHLORIDE 0.4 MG/1
0.4 CAPSULE ORAL DAILY
Status: DISCONTINUED | OUTPATIENT
Start: 2023-08-22 | End: 2023-08-23 | Stop reason: HOSPADM

## 2023-08-21 RX ORDER — ACETAMINOPHEN 500 MG
1000 TABLET ORAL
Status: COMPLETED | OUTPATIENT
Start: 2023-08-21 | End: 2023-08-21

## 2023-08-21 RX ORDER — PROPOFOL 10 MG/ML
VIAL (ML) INTRAVENOUS
Status: DISCONTINUED | OUTPATIENT
Start: 2023-08-21 | End: 2023-08-21

## 2023-08-21 RX ORDER — LIDOCAINE HYDROCHLORIDE 20 MG/ML
INJECTION INTRAVENOUS
Status: DISCONTINUED | OUTPATIENT
Start: 2023-08-21 | End: 2023-08-21

## 2023-08-21 RX ORDER — LIDOCAINE HYDROCHLORIDE 5 MG/ML
INJECTION, SOLUTION INFILTRATION; PERINEURAL
Status: DISCONTINUED | OUTPATIENT
Start: 2023-08-21 | End: 2023-08-22 | Stop reason: HOSPADM

## 2023-08-21 RX ORDER — HYDROMORPHONE HYDROCHLORIDE 2 MG/ML
0.5 INJECTION, SOLUTION INTRAMUSCULAR; INTRAVENOUS; SUBCUTANEOUS EVERY 5 MIN PRN
Status: DISCONTINUED | OUTPATIENT
Start: 2023-08-21 | End: 2023-08-21 | Stop reason: HOSPADM

## 2023-08-21 RX ORDER — ONDANSETRON 2 MG/ML
4 INJECTION INTRAMUSCULAR; INTRAVENOUS EVERY 6 HOURS PRN
Status: DISCONTINUED | OUTPATIENT
Start: 2023-08-21 | End: 2023-08-23 | Stop reason: HOSPADM

## 2023-08-21 RX ORDER — MUPIROCIN 20 MG/G
OINTMENT TOPICAL 2 TIMES DAILY
Status: DISCONTINUED | OUTPATIENT
Start: 2023-08-21 | End: 2023-08-23 | Stop reason: HOSPADM

## 2023-08-21 RX ORDER — PHENYLEPHRINE HYDROCHLORIDE 10 MG/ML
INJECTION INTRAVENOUS
Status: DISCONTINUED | OUTPATIENT
Start: 2023-08-21 | End: 2023-08-21

## 2023-08-21 RX ORDER — TALC
6 POWDER (GRAM) TOPICAL NIGHTLY
Status: DISCONTINUED | OUTPATIENT
Start: 2023-08-21 | End: 2023-08-23 | Stop reason: HOSPADM

## 2023-08-21 RX ORDER — ACETAMINOPHEN 325 MG/1
650 TABLET ORAL EVERY 6 HOURS
Status: DISCONTINUED | OUTPATIENT
Start: 2023-08-21 | End: 2023-08-23 | Stop reason: HOSPADM

## 2023-08-21 RX ORDER — ONDANSETRON 2 MG/ML
4 INJECTION INTRAMUSCULAR; INTRAVENOUS DAILY PRN
Status: DISCONTINUED | OUTPATIENT
Start: 2023-08-21 | End: 2023-08-21 | Stop reason: HOSPADM

## 2023-08-21 RX ORDER — LIDOCAINE HYDROCHLORIDE 10 MG/ML
1 INJECTION, SOLUTION EPIDURAL; INFILTRATION; INTRACAUDAL; PERINEURAL ONCE
Status: DISCONTINUED | OUTPATIENT
Start: 2023-08-21 | End: 2023-08-21 | Stop reason: HOSPADM

## 2023-08-21 RX ORDER — SODIUM CHLORIDE, SODIUM LACTATE, POTASSIUM CHLORIDE, CALCIUM CHLORIDE 600; 310; 30; 20 MG/100ML; MG/100ML; MG/100ML; MG/100ML
INJECTION, SOLUTION INTRAVENOUS CONTINUOUS
Status: DISCONTINUED | OUTPATIENT
Start: 2023-08-21 | End: 2023-08-22

## 2023-08-21 RX ORDER — OXYCODONE HYDROCHLORIDE 5 MG/1
5 TABLET ORAL EVERY 4 HOURS PRN
Status: DISCONTINUED | OUTPATIENT
Start: 2023-08-21 | End: 2023-08-23 | Stop reason: HOSPADM

## 2023-08-21 RX ADMIN — CEFAZOLIN SODIUM 2 G: 2 SOLUTION INTRAVENOUS at 01:08

## 2023-08-21 RX ADMIN — CEFTRIAXONE SODIUM 1 G: 1 INJECTION, POWDER, FOR SOLUTION INTRAMUSCULAR; INTRAVENOUS at 08:08

## 2023-08-21 RX ADMIN — ACETAMINOPHEN 1000 MG: 10 INJECTION, SOLUTION INTRAVENOUS at 01:08

## 2023-08-21 RX ADMIN — ROCURONIUM BROMIDE 20 MG: 10 INJECTION, SOLUTION INTRAVENOUS at 03:08

## 2023-08-21 RX ADMIN — SODIUM CHLORIDE, SODIUM LACTATE, POTASSIUM CHLORIDE, AND CALCIUM CHLORIDE: .6; .31; .03; .02 INJECTION, SOLUTION INTRAVENOUS at 01:08

## 2023-08-21 RX ADMIN — ACETAMINOPHEN 1000 MG: 500 TABLET ORAL at 07:08

## 2023-08-21 RX ADMIN — ROCURONIUM BROMIDE 50 MG: 10 INJECTION, SOLUTION INTRAVENOUS at 01:08

## 2023-08-21 RX ADMIN — SUGAMMADEX 200 MG: 100 INJECTION, SOLUTION INTRAVENOUS at 04:08

## 2023-08-21 RX ADMIN — FENTANYL CITRATE 100 MCG: 0.05 INJECTION, SOLUTION INTRAMUSCULAR; INTRAVENOUS at 01:08

## 2023-08-21 RX ADMIN — FENTANYL CITRATE 50 MCG: 50 INJECTION, SOLUTION INTRAMUSCULAR; INTRAVENOUS at 08:08

## 2023-08-21 RX ADMIN — PHENYLEPHRINE HYDROCHLORIDE 100 MCG: 10 INJECTION INTRAVENOUS at 02:08

## 2023-08-21 RX ADMIN — MIDAZOLAM HYDROCHLORIDE 1 MG: 1 INJECTION, SOLUTION INTRAMUSCULAR; INTRAVENOUS at 08:08

## 2023-08-21 RX ADMIN — ONDANSETRON 4 MG: 2 INJECTION INTRAMUSCULAR; INTRAVENOUS at 04:08

## 2023-08-21 RX ADMIN — FENTANYL CITRATE 50 MCG: 50 INJECTION, SOLUTION INTRAMUSCULAR; INTRAVENOUS at 09:08

## 2023-08-21 RX ADMIN — MUPIROCIN: 20 OINTMENT TOPICAL at 09:08

## 2023-08-21 RX ADMIN — LIDOCAINE HYDROCHLORIDE 5 ML: 5 INJECTION, SOLUTION INFILTRATION; PERINEURAL at 08:08

## 2023-08-21 RX ADMIN — DOCUSATE SODIUM 100 MG: 100 CAPSULE, LIQUID FILLED ORAL at 09:08

## 2023-08-21 RX ADMIN — PHENYLEPHRINE HYDROCHLORIDE 100 MCG: 10 INJECTION INTRAVENOUS at 03:08

## 2023-08-21 RX ADMIN — PROPOFOL 200 MG: 10 INJECTION, EMULSION INTRAVENOUS at 01:08

## 2023-08-21 RX ADMIN — SODIUM CHLORIDE, POTASSIUM CHLORIDE, SODIUM LACTATE AND CALCIUM CHLORIDE: 600; 310; 30; 20 INJECTION, SOLUTION INTRAVENOUS at 06:08

## 2023-08-21 RX ADMIN — ACETAMINOPHEN 650 MG: 325 TABLET ORAL at 09:08

## 2023-08-21 RX ADMIN — OXYCODONE HYDROCHLORIDE 10 MG: 5 TABLET ORAL at 09:08

## 2023-08-21 RX ADMIN — MIDAZOLAM HYDROCHLORIDE 2 MG: 1 INJECTION, SOLUTION INTRAMUSCULAR; INTRAVENOUS at 01:08

## 2023-08-21 RX ADMIN — DEXAMETHASONE SODIUM PHOSPHATE 8 MG: 4 INJECTION, SOLUTION INTRA-ARTICULAR; INTRALESIONAL; INTRAMUSCULAR; INTRAVENOUS; SOFT TISSUE at 01:08

## 2023-08-21 RX ADMIN — LIDOCAINE HYDROCHLORIDE 100 MG: 20 INJECTION, SOLUTION INTRAVENOUS at 01:08

## 2023-08-21 NOTE — ANESTHESIA POSTPROCEDURE EVALUATION
Anesthesia Post Evaluation    Patient: Tavo Chadwick    Procedure(s) Performed: Procedure(s) (LRB):  NEPHROLITHOTOMY, PERCUTANEOUS AND HOMIUM LASER LITHOTRIPSY (Right)    Final Anesthesia Type: general      Patient location during evaluation: PACU  Patient participation: Yes- Able to Participate  Level of consciousness: awake and alert  Post-procedure vital signs: reviewed and stable  Pain management: adequate  Airway patency: patent    PONV status at discharge: No PONV  Anesthetic complications: no      Cardiovascular status: stable  Respiratory status: room air  Hydration status: euvolemic  Follow-up not needed.          Vitals Value Taken Time   /93 08/21/23 1808   Temp 36.4 °C (97.5 °F) 08/21/23 1808   Pulse 76 08/21/23 1808   Resp 18 08/21/23 1808   SpO2 94 % 08/21/23 1808         Event Time   Out of Recovery 17:53:21         Pain/Jami Score: Pain Rating Prior to Med Admin: 0 (8/21/2023  7:07 AM)  Jami Score: 10 (8/21/2023  5:45 PM)        
home

## 2023-08-21 NOTE — OP NOTE
OPERATIVE NOTE    LOCATION:  [facility]    DATE OF OPERATION:  8/21/23     SURGEON:  Thong Ivey M.D.       PREOPERATIVE DIAGNOSIS:  1.   Right renal staones     POSTOPERATIVE DIAGNOSIS:  1.   same     OPERATION PERFORMED:  1.   right percutaneous nephrolithotomy, >2.5 cm.  2.   right antegrade pyelogram, right stent placement  3.   right antegrade ureteroscopy with holmium laser lithotripsy and stone basket extraction.  4.  right percutaneous nephrostomy tube placement.  5.   Fluoroscopy with interpretation, less than 1 hour.  6.  Strauss placement by MD       ANESTHESIA:  General endotracheal.     ESTIMATED BLOOD LOSS:  100     FLUIDS:  500     COMPLICATIONS:  None.     CONDITION:  Stable.     TUBES AND DRAINS:  1.   An 18-[place] tip catheter as right percutaneous nephrostomy tube  2.  6F x 26 cm right JJ stent  3.  16F strauss      INDICATIONS FOR PROCEDURE:  54 yo M who presented to the outpatient urology clinic with a symptomatic right renal calculi.  He had a CT scan showing >2.5 cm of stone burden to right renal pelvis and lower pole of kidney.  The risks, benefits, and alternatives were discussed with the patient.  He wished to proceed with percutaneous nephrolithotomy.  Consent was obtained prior to the operation.     FINDINGS: START HERE   1.  Right 2.5  cm right renal pelvis stone stone, right 1.5 cm and 5mm LP stones   2.   Appeared to have complete stone clearance.  Some stone fragments down ureter requiring laser lithotripsy, antegrade ureteroscope.    3.    Antegrade nephrostogram with percutaneous nephrostomy tube in adequate position and stent in place with antegrade flow     DESCRIPTION OF OPERATION:  The patient was brought from the preoperative holding area to the operating room and placed in the supine position.  The patient was then put under general anesthesia.  The patient received preoperative antibiotics.  SCDs were placed to bilateral lower extremities.  At this point, the  patient was moved from the supine position to the prone position on the operating room table.  We took extra care at this point to make sure all of his pressure points were adequately padded.  The patient was then prepped and draped in normal sterile fashion after Anesthesia had secured the endotracheal tube.  Prior to prepping the patient, the percutaneous nephroureteral stent placed by Interventional Radiology that had been performed preoperatively was prepped into the field.  A preoperative time-out was performed.  The correct patient, side, and anticipated procedure were agreed upon by all in the room.  We then placed a 0.038 sensor wire down the open-ended nephroureteral stent placed by Interventional Radiology.  This was navigated into the bladder under fluoroscopic guidance.  Next, the 4-[place] nephroureteral stent was subsequently removed.  I placed a dual lumen catheter over the Sensor wire into the renal pelvis and down the proximal right ureter.  Contrast was injected showing good position of access in lower pole. Through the dual lumen, a second stiff wire was placed down the ureter.  The dilating balloon was then placed over the wire under fluoroscopic guidance just into the lower pole calyx that had been accessed by Interventional Radiology.  Using contrast to fill the dilation balloon, we were able to confirm our position in the proximal aspect of the calyx.  At this point, the dilating balloon was inflated to 30-[place], adequately dilated the tract.  Prior to placement of the dilating balloon, a cruciate incision in the fascia was made.  An incision was made in the skin using a 11 blade scalpel, it was adequate size to allow for a 30-[place] sheath to be placed.  After dilation had been performed, the sheath was placed over the dilating balloon.  The nephroscope was able to be passed into the renal pelvis. A large approximately 2.5 cm stone was visualized in the right renal pelvis, and the shock pulse  device was brought through the nephroscope and able to fragment the stone into multiple pieces and completely remove all the subsequent pieces.  The lower pole stone was also removed.  There appeared to be small fragments that wend down the ureter.  A flexible ureteroscope was passed through the sheath and down the ureter, multiple stones in the ureter were removed with a basket, some stones were dusted with a 200 micron holmium laser fiber.  The scope was passed to the bladder and there were not further fragments noted.  The ureteroscope was subsequently slowly removed, and there was noted to be no abnormality at the level of the mid proximal or UPJ on the right side.  Satisfied with this complete stone clearance, I removed the nephroscope and passed a flexible cystoscope into the collecting system.  I examined all the calyces under visual and fluoroscopic guidance using a right-sided pyelogram that was performed through the cystoscope; there were no residual stones.   I then re-introduced the nephroscope back loaded over the stiff wire.  A 6F x 26 cm right JJ stent was placed and there was a curl in the bladder and in the right renal pelvis.  The sheath was removed.  I then completed the procedure by placing a percutaneous nephrostomy tube over the existing safety wire. A 18F [place] tip strauss was placed over the wire after the sheath was removed, 3cc of water/contrast mix was used to partially inflate the balloon.   A nephrostogram was performed through the 18-[place] tip catheter confirming position in the collecting system.  Satisfied with the tube placement, the wire was removed, and the [place] tip catheter was sutured in place to the skin.  The procedure was terminated, and the right percutaneous nephrostomy tube was placed to gravity drainage.  The patient was cleaned and dried, 4x4's, and Bioclusive dressing was placed over the right percutaneous site.  The patient was moved from the prone position  to the supine position, awoken from anesthesia, and transferred to the postanesthesia care unit in stable condition having tolerated the procedure well.    Disposition: admit for recovery, antegrade nephrostogram and PCN removal tomorrow if urine clearing. Follow up in 2-3 week with NC[facility] prior to stent removal.     Thong Ivey MD

## 2023-08-21 NOTE — PROCEDURES
Radiology Post-Procedure Note    Pre Op Diagnosis: right staghorn calculus for PCNL    Post Op Diagnosis: same     Procedure:right percutaneous nephroureteral stent placement    Procedure performed by: Abdelrahman Alcaraz MD    Written Informed Consent Obtained: Yes    Specimen Removed: NO    Estimated Blood Loss: less than 50     Findings: Local anesthesia and moderate sedation were used.      Ultrasound and fluoroscopy were used to localize the right collecting system and a percutaneous nephroureteral catheter was introduced.  Position of the catheter in the collecting system/bladder was confirmed using injection of iodinated contrast.      The patient tolerated the procedure well and there were no complications. PCNL by Urology to follow later today.  Please see Imaging report for further details.    Abdelrahman Alcaraz MD  Staff Radiologist  Department of Radiology  Pager: [phone]

## 2023-08-21 NOTE — TRANSFER OF CARE
"Anesthesia Transfer of Care Note    Patient: Tavo Chadwick    Procedure(s) Performed: Procedure(s) (LRB):  NEPHROLITHOTOMY, PERCUTANEOUS AND HOMIUM LASER LITHOTRIPSY (Right)    Patient location: PACU    Anesthesia Type: general    Transport from OR: Transported from OR on 6-10 L/min O2 by face mask with adequate spontaneous ventilation    Post pain: adequate analgesia    Post assessment: no apparent anesthetic complications and tolerated procedure well    Post vital signs: stable    Level of consciousness: awake, alert and oriented    Nausea/Vomiting: no nausea/vomiting    Complications: none    Transfer of care protocol was followed      Last vitals:   Visit Vitals  /85 (BP Location: Right arm, Patient Position: Lying)   Pulse 74   Temp 36.2 °C (97.2 °F) (Skin)   Resp 18   Ht 5' 11" (1.803 m)   SpO2 97%   BMI 32.42 kg/m²     "

## 2023-08-21 NOTE — INTERVAL H&P NOTE
The patient has been examined and the H&P has been reviewed:    I concur with the findings and no changes have occurred since H&P was written.    Anesthesia/Surgery risks, benefits and alternative options discussed and understood by patient/family.      Problem Noted   Kidney Stones 8/9/2023    Left 4 mm UVJ stone with hydronephrosis, right 2.7 cm renal pelvis and right nonobstructing stones on CT 07/22/2023       OR for right PCNL, indicated procedures  The risks, benefits, alteratives of the procedure were discussed with the patient.  The patient was given time for questions, all questions were answered.  Written, informed consent was obtained.      Thong Ivey MD

## 2023-08-21 NOTE — ANESTHESIA PREPROCEDURE EVALUATION
08/20/2023  [facility]  Anesthesia Pre-Operative Evaluation         Patient Name: Tavo Chadwick  YOB: 1970  MRN: [id number]    SUBJECTIVE:     Pre-operative evaluation for Procedure(s) (LRB):  NEPHROLITHOTOMY, PERCUTANEOUS (Right)     08/20/2023    Tavo Chadwick is a 53 y.o. male  Patient now presents for the above procedure(s).    TTE: none documented.     Prev airway: none documented.    Patient Active Problem List   Diagnosis    Kidney stones       Review of patient's allergies indicates:  No Known Allergies    Current Inpatient Medications:      No current facility-administered medications on file prior to encounter.     Current Outpatient Medications on File Prior to Encounter   Medication Sig Dispense Refill    ondansetron (ZOFRAN-ODT) 4 MG TbDL Take 1 tablet (4 mg total) by mouth every 6 (six) hours as needed (Nausea or vomiting). 15 tablet 0    oxyCODONE-acetaminophen (PERCOCET) 5-325 mg per tablet Take 1 tablet by mouth every 4 (four) hours as needed for Pain. (Patient not taking: Reported on 8/9/2023) 15 tablet 0    tamsulosin (FLOMAX) 0.4 mg Cap Take 1 capsule (0.4 mg total) by mouth once daily. (Patient not taking: Reported on 8/9/2023) 10 capsule 0       No past surgical history on file.    OBJECTIVE:     Vital Signs Range (Last 24H):         Significant Labs:  Lab Results   Component Value Date    WBC 11.07 07/22/2023    HGB 14.1 07/22/2023    HCT 41.3 07/22/2023     07/22/2023    ALT 30 07/22/2023    AST 21 07/22/2023     07/22/2023    K 4.2 07/22/2023     07/22/2023    CREATININE 1.2 07/22/2023    BUN 16 07/22/2023    CO2 25 07/22/2023       Diagnostic Studies: No relevant studies.    EKG:   Results for orders placed or performed during the hospital encounter of 07/22/23   EKG 12-lead    Collection Time: 07/22/23  7:14 AM    Narrative    Test  Reason : R07.9,    Vent. Rate : 077 BPM     Atrial Rate : 077 BPM     P-R Int : 154 ms          QRS Dur : 094 ms      QT Int : 394 ms       P-R-T Axes : 079 087 091 degrees     QTc Int : 445 ms    Normal sinus rhythm  Normal ECG  No previous ECGs available  Confirmed by Arely Martinez MD ([id number]) on 7/24/2023 2:25:22 PM    Referred By: [email or web address]   SELF           Confirmed By:Arely Martinez MD       ASSESSMENT/PLAN:           Pre-op Assessment    I have reviewed the Patient Summary Reports.     I have reviewed the Nursing Notes. I have reviewed the NPO Status.   I have reviewed the Medications.     Review of Systems  Anesthesia Hx:  Denies Family Hx of Anesthesia complications.   Denies Personal Hx of Anesthesia complications.   Cardiovascular:   Exercise tolerance: good    Pulmonary:  Pulmonary Normal    Renal/:   renal calculi           Anesthesia Plan  Type of Anesthesia, risks & benefits discussed:    Anesthesia Type: Gen ETT  Intra-op Monitoring Plan: Standard ASA Monitors  Post Op Pain Control Plan: multimodal analgesia and IV/PO Opioids PRN  Induction:  IV  Informed Consent: Informed consent signed with the Patient and all parties understand the risks and agree with anesthesia plan.  All questions answered.   ASA Score: 3  Day of Surgery Review of History & Physical: H&P Update referred to the surgeon/provider.    Ready For Surgery From Anesthesia Perspective.     .

## 2023-08-21 NOTE — NURSING
Transported pt back to [facility], bedside report given to PHILLIP Antoine, spouse also at bedside, IR care complete, pt awaiting for OR procedure

## 2023-08-22 LAB
ANION GAP SERPL CALC-SCNC: 14 MMOL/L (ref 8–16)
BASOPHILS # BLD AUTO: 0.01 K/UL (ref 0–0.2)
BASOPHILS NFR BLD: 0.1 % (ref 0–1.9)
BUN SERPL-MCNC: 12 MG/DL (ref 6–20)
CALCIUM SERPL-MCNC: 9.5 MG/DL (ref 8.7–10.5)
CHLORIDE SERPL-SCNC: 102 MMOL/L (ref 95–110)
CO2 SERPL-SCNC: 23 MMOL/L (ref 23–29)
CREAT SERPL-MCNC: 0.9 MG/DL (ref 0.5–1.4)
DIFFERENTIAL METHOD: ABNORMAL
EOSINOPHIL # BLD AUTO: 0 K/UL (ref 0–0.5)
EOSINOPHIL NFR BLD: 0 % (ref 0–8)
ERYTHROCYTE [DISTWIDTH] IN BLOOD BY AUTOMATED COUNT: 13 % (ref 11.5–14.5)
EST. GFR  (NO RACE VARIABLE): >60 ML/MIN/1.73 M^2
GLUCOSE SERPL-MCNC: 166 MG/DL (ref 70–110)
HCT VFR BLD AUTO: 44.6 % (ref 40–54)
HGB BLD-MCNC: 15.2 G/DL (ref 14–18)
IMM GRANULOCYTES # BLD AUTO: 0.1 K/UL (ref 0–0.04)
IMM GRANULOCYTES NFR BLD AUTO: 0.6 % (ref 0–0.5)
LYMPHOCYTES # BLD AUTO: 1.3 K/UL (ref 1–4.8)
LYMPHOCYTES NFR BLD: 7.2 % (ref 18–48)
MCH RBC QN AUTO: 31.1 PG (ref 27–31)
MCHC RBC AUTO-ENTMCNC: 34.1 G/DL (ref 32–36)
MCV RBC AUTO: 91 FL (ref 82–98)
MONOCYTES # BLD AUTO: 0.8 K/UL (ref 0.3–1)
MONOCYTES NFR BLD: 4.1 % (ref 4–15)
NEUTROPHILS # BLD AUTO: 15.9 K/UL (ref 1.8–7.7)
NEUTROPHILS NFR BLD: 88 % (ref 38–73)
NRBC BLD-RTO: 0 /100 WBC
PLATELET # BLD AUTO: 269 K/UL (ref 150–450)
PMV BLD AUTO: 11.4 FL (ref 9.2–12.9)
POTASSIUM SERPL-SCNC: 4 MMOL/L (ref 3.5–5.1)
RBC # BLD AUTO: 4.88 M/UL (ref 4.6–6.2)
SODIUM SERPL-SCNC: 139 MMOL/L (ref 136–145)
WBC # BLD AUTO: 18.1 K/UL (ref 3.9–12.7)

## 2023-08-22 PROCEDURE — 36415 COLL VENOUS BLD VENIPUNCTURE: CPT | Performed by: STUDENT IN AN ORGANIZED HEALTH CARE EDUCATION/TRAINING PROGRAM

## 2023-08-22 PROCEDURE — 51798 US URINE CAPACITY MEASURE: CPT

## 2023-08-22 PROCEDURE — 63600175 PHARM REV CODE 636 W HCPCS: Performed by: UROLOGY

## 2023-08-22 PROCEDURE — 11000001 HC ACUTE MED/SURG PRIVATE ROOM

## 2023-08-22 PROCEDURE — 85025 COMPLETE CBC W/AUTO DIFF WBC: CPT | Performed by: STUDENT IN AN ORGANIZED HEALTH CARE EDUCATION/TRAINING PROGRAM

## 2023-08-22 PROCEDURE — 94760 N-INVAS EAR/PLS OXIMETRY 1: CPT

## 2023-08-22 PROCEDURE — 25000003 PHARM REV CODE 250: Performed by: STUDENT IN AN ORGANIZED HEALTH CARE EDUCATION/TRAINING PROGRAM

## 2023-08-22 PROCEDURE — 94799 UNLISTED PULMONARY SVC/PX: CPT

## 2023-08-22 PROCEDURE — 94761 N-INVAS EAR/PLS OXIMETRY MLT: CPT

## 2023-08-22 PROCEDURE — 99900035 HC TECH TIME PER 15 MIN (STAT)

## 2023-08-22 PROCEDURE — 25000003 PHARM REV CODE 250: Performed by: UROLOGY

## 2023-08-22 PROCEDURE — 80048 BASIC METABOLIC PNL TOTAL CA: CPT | Performed by: STUDENT IN AN ORGANIZED HEALTH CARE EDUCATION/TRAINING PROGRAM

## 2023-08-22 RX ORDER — CEPHALEXIN 500 MG/1
500 CAPSULE ORAL 4 TIMES DAILY
Qty: 12 CAPSULE | Refills: 0 | Status: SHIPPED | OUTPATIENT
Start: 2023-08-22 | End: 2023-08-25

## 2023-08-22 RX ORDER — ONDANSETRON 4 MG/1
4 TABLET, ORALLY DISINTEGRATING ORAL EVERY 8 HOURS PRN
Qty: 9 TABLET | Refills: 0 | Status: SHIPPED | OUTPATIENT
Start: 2023-08-22 | End: 2023-08-25

## 2023-08-22 RX ORDER — SODIUM CHLORIDE, SODIUM LACTATE, POTASSIUM CHLORIDE, CALCIUM CHLORIDE 600; 310; 30; 20 MG/100ML; MG/100ML; MG/100ML; MG/100ML
INJECTION, SOLUTION INTRAVENOUS CONTINUOUS
Status: DISCONTINUED | OUTPATIENT
Start: 2023-08-22 | End: 2023-08-23 | Stop reason: HOSPADM

## 2023-08-22 RX ORDER — CEFAZOLIN SODIUM 2 G/50ML
2 SOLUTION INTRAVENOUS
Status: DISCONTINUED | OUTPATIENT
Start: 2023-08-22 | End: 2023-08-23 | Stop reason: HOSPADM

## 2023-08-22 RX ORDER — OXYCODONE AND ACETAMINOPHEN 5; 325 MG/1; MG/1
1 TABLET ORAL EVERY 4 HOURS PRN
Qty: 18 TABLET | Refills: 0 | Status: SHIPPED | OUTPATIENT
Start: 2023-08-22 | End: 2023-08-25

## 2023-08-22 RX ORDER — TAMSULOSIN HYDROCHLORIDE 0.4 MG/1
0.4 CAPSULE ORAL DAILY
Qty: 30 CAPSULE | Refills: 0 | Status: SHIPPED | OUTPATIENT
Start: 2023-08-22 | End: 2023-09-19

## 2023-08-22 RX ADMIN — MORPHINE SULFATE 4 MG: 4 INJECTION INTRAVENOUS at 01:08

## 2023-08-22 RX ADMIN — CEFAZOLIN SODIUM 2 G: 2 SOLUTION INTRAVENOUS at 10:08

## 2023-08-22 RX ADMIN — OXYCODONE HYDROCHLORIDE 10 MG: 5 TABLET ORAL at 03:08

## 2023-08-22 RX ADMIN — SODIUM CHLORIDE, POTASSIUM CHLORIDE, SODIUM LACTATE AND CALCIUM CHLORIDE: 600; 310; 30; 20 INJECTION, SOLUTION INTRAVENOUS at 06:08

## 2023-08-22 RX ADMIN — ACETAMINOPHEN 650 MG: 325 TABLET ORAL at 12:08

## 2023-08-22 RX ADMIN — DOCUSATE SODIUM 100 MG: 100 CAPSULE, LIQUID FILLED ORAL at 10:08

## 2023-08-22 RX ADMIN — MUPIROCIN: 20 OINTMENT TOPICAL at 10:08

## 2023-08-22 RX ADMIN — TAMSULOSIN HYDROCHLORIDE 0.4 MG: 0.4 CAPSULE ORAL at 10:08

## 2023-08-22 RX ADMIN — SODIUM CHLORIDE, POTASSIUM CHLORIDE, SODIUM LACTATE AND CALCIUM CHLORIDE: 600; 310; 30; 20 INJECTION, SOLUTION INTRAVENOUS at 08:08

## 2023-08-22 RX ADMIN — CEFAZOLIN SODIUM 2 G: 2 SOLUTION INTRAVENOUS at 06:08

## 2023-08-22 RX ADMIN — OXYCODONE HYDROCHLORIDE 10 MG: 5 TABLET ORAL at 06:08

## 2023-08-22 RX ADMIN — ACETAMINOPHEN 650 MG: 325 TABLET ORAL at 05:08

## 2023-08-22 NOTE — ASSESSMENT & PLAN NOTE
52 yo male with above comorbidities. POD1 from right PCNL.    - WBC 18  - Follow up BMP  - Right antegrade nephrostogram and possible PCNT removal by IR ordered. Will call this am.  - Begin Ancef  - Ambulate  - Maintain Marquez until after neph tube removal  - Continue IV fluids for now  - Regular diet  - MM pain control

## 2023-08-22 NOTE — PLAN OF CARE
Marquez catheter discontinued, Tip intact. Pt tolerated well. Spouse at bedside. Informed to notify nurse after voiding.

## 2023-08-22 NOTE — PLAN OF CARE
CM met with pt - family at bedside including wife Jonas    Independent prior to admit - no hh, no dme     Dx - R renal stones   s/p R nephrolitotomy R percutaneous tube  placed (now removed)   strauss in place       Independent prior to admit - no dme, no hh   Pt has transportation to home at discharge.          08/22/23 1828   Discharge Assessment   Assessment Type Discharge Planning Assessment   Confirmed/corrected address, phone number and insurance Yes   Confirmed Demographics Correct on Facesheet   Source of Information patient;family;health record   Communicated ALEXANDREA with patient/caregiver Yes   Reason For Admission R renal stones   People in Home child(sarah), adult;spouse   Do you expect to return to your current living situation? Yes   Do you have help at home or someone to help you manage your care at home? Yes   Who are your caregiver(s) and their phone number(s)? Wife Jonas    Prior to hospitilization cognitive status: Alert/Oriented   Current cognitive status: Alert/Oriented   Equipment Currently Used at Home none   Readmission within 30 days? No   Patient currently being followed by outpatient case management? No   Do you currently have service(s) that help you manage your care at home? No   Do you take prescription medications? Yes  ([facility])   Do you have any problems affording any of your prescribed medications? No   Is the patient taking medications as prescribed? yes   Who is going to help you get home at discharge? family/wife Jonas   How do you get to doctors appointments? car, drives self   Are you on dialysis? No   Do you take coumadin? No   DME Needed Upon Discharge  none   Discharge Plan discussed with: Patient;Spouse/sig other   Transition of Care Barriers None   Discharge Plan A Home;Home with family

## 2023-08-22 NOTE — PLAN OF CARE
Pt AAox4. Vitals WNLs. Marquez/Neph-tube intact and draining, serosanguinous output. LR infusing per order. No complications overnight.. Questions/Concerns addressed.   Problem: Adult Inpatient Plan of Care  Goal: Plan of Care Review  Outcome: Ongoing, Progressing     Problem: Fall Injury Risk  Goal: Absence of Fall and Fall-Related Injury  Outcome: Ongoing, Progressing  Intervention: Promote Injury-Free Environment  Flowsheets (Taken 8/22/2023 0557)  Safety Promotion/Fall Prevention:   bed alarm set   Fall Risk reviewed with patient/family     Problem: Renal Function Impairment (Acute Kidney Injury/Impairment)  Goal: Effective Renal Function  Outcome: Ongoing, Progressing     Vitals:    08/22/23 0419   BP: 126/78   Pulse: 95   Resp: 18   Temp: 97.9 °F (36.6 °C)     ;s

## 2023-08-22 NOTE — PROGRESS NOTES
[facility]  Urology  Progress Note    Patient Name: Tavo Chadwick  MRN: [id number]  Admission Date: 8/21/2023  Hospital Length of Stay: 1 days  Code Status: Full Code   Attending Provider: Thong Ivey MD   Primary Care Physician: Suzie Reyez MD    Subjective:     HPI:  54 yo male with large right renal stone burden s/p right PCNL 8/21/23 with Dr. Ivey.      Interval History: NAEO. AFVSS. WBC 18, BMP in process. Minimal pain controlled with po meds. Did not ambulate. Tolerating po diet. Right neph tube plugged on rounds.    Right neph tube: 650/850, light pink  Urethral Marquez: --=1450, clear yellow      Objective:     Temp:  [97.2 °F (36.2 °C)-98.4 °F (36.9 °C)] 97.9 °F (36.6 °C)  Pulse:  [72-95] 95  Resp:  [10-20] 18  SpO2:  [91 %-100 %] 94 %  BP: (114-150)/(78-98) 126/78     Body mass index is 32.78 kg/m².           Drains       Drain  Duration                  Nephrostomy 08/21/23 Right 18 Fr. 1 day         Ureteral Drain/Stent 08/21/23 Right ureter 6 Fr. 1 day         Urethral Catheter 08/21/23 Silicone 16 Fr. 1 day                     Physical Exam  Vitals and nursing note reviewed.   Constitutional:       Appearance: Normal appearance.   HENT:      Head: Atraumatic.      Nose: Nose normal.   Eyes:      Extraocular Movements: Extraocular movements intact.      Pupils: Pupils are equal, round, and reactive to light.   Cardiovascular:      Rate and Rhythm: Normal rate.   Pulmonary:      Effort: Pulmonary effort is normal.   Abdominal:      General: Abdomen is flat. There is no distension.      Tenderness: There is no abdominal tenderness. There is no right CVA tenderness or left CVA tenderness.   Genitourinary:     Comments: Right nephrostomy tube draining light pink, plugged; Dressing intact  Urethral Marquez draining clear yellow urine  Musculoskeletal:         General: Normal range of motion.      Cervical back: Normal range of motion.   Skin:     Coloration: Skin is not jaundiced.  "  Neurological:      General: No focal deficit present.      Mental Status: He is alert and oriented to person, place, and time.   Psychiatric:         Mood and Affect: Mood normal.         Behavior: Behavior normal.           Significant Labs:    BMP:  Recent Labs   Lab 08/21/23  0715 08/22/23  0425    139   K 3.8 4.0    102   CO2 25 23   BUN 14 12   CREATININE 0.9 0.9   CALCIUM 9.3 9.5       CBC:   Recent Labs   Lab 08/21/23  0715 08/22/23  0425   WBC 7.68 18.10*   HGB 14.1 15.2   HCT 40.8 44.6    269       Blood Culture: No results for input(s): "LABBLOO" in the last 168 hours.  Urine Culture: No results for input(s): "LABURIN" in the last 168 hours.  Urine Studies: No results for input(s): "COLORU", "APPEARANCEUA", "PHUR", "SPECGRAV", "PROTEINUA", "GLUCUA", "KETONESU", "BILIRUBINUA", "OCCULTUA", "NITRITE", "UROBILINOGEN", "LEUKOCYTESUR", "RBCUA", "WBCUA", "BACTERIA", "SQUAMEPITHEL", "HYALINECASTS" in the last 168 hours.    Invalid input(s): "WRIGHTSUR"    Significant Imaging:  All pertinent imaging results/findings from the past 24 hours have been reviewed.        Assessment/Plan:     * Kidney stones  54 yo male with above comorbidities. POD1 from right PCNL.    - WBC 18  - Follow up BMP  - Right antegrade nephrostogram and possible PCNT removal by IR ordered. Will call this am.  - Begin Ancef  - Ambulate  - Maintain Marquez until after neph tube removal  - Continue IV fluids for now  - Regular diet  - MM pain control        VTE Risk Mitigation (From admission, onward)    None          Rome Abraham MD  Urology  [place] [facility]  "

## 2023-08-22 NOTE — NURSING
Nephrostomy tube removed per MD. Gauze and Tegaderm placed to site. Serosanguinous Drainage noted.      STAT Xray completed

## 2023-08-22 NOTE — SUBJECTIVE & OBJECTIVE
Interval History: NAEO. AFVSS. WBC 18, BMP in process. Minimal pain controlled with po meds. Did not ambulate. Tolerating po diet. Right neph tube plugged on rounds.    Right neph tube: 650/850, light pink  Urethral Marquez: --=1450, clear yellow      Objective:     Temp:  [97.2 °F (36.2 °C)-98.4 °F (36.9 °C)] 97.9 °F (36.6 °C)  Pulse:  [72-95] 95  Resp:  [10-20] 18  SpO2:  [91 %-100 %] 94 %  BP: (114-150)/(78-98) 126/78     Body mass index is 32.78 kg/m².           Drains       Drain  Duration                  Nephrostomy 08/21/23 Right 18 Fr. 1 day         Ureteral Drain/Stent 08/21/23 Right ureter 6 Fr. 1 day         Urethral Catheter 08/21/23 Silicone 16 Fr. 1 day                     Physical Exam  Vitals and nursing note reviewed.   Constitutional:       Appearance: Normal appearance.   HENT:      Head: Atraumatic.      Nose: Nose normal.   Eyes:      Extraocular Movements: Extraocular movements intact.      Pupils: Pupils are equal, round, and reactive to light.   Cardiovascular:      Rate and Rhythm: Normal rate.   Pulmonary:      Effort: Pulmonary effort is normal.   Abdominal:      General: Abdomen is flat. There is no distension.      Tenderness: There is no abdominal tenderness. There is no right CVA tenderness or left CVA tenderness.   Genitourinary:     Comments: Right nephrostomy tube draining light pink, plugged; Dressing intact  Urethral Marquez draining clear yellow urine  Musculoskeletal:         General: Normal range of motion.      Cervical back: Normal range of motion.   Skin:     Coloration: Skin is not jaundiced.   Neurological:      General: No focal deficit present.      Mental Status: He is alert and oriented to person, place, and time.   Psychiatric:         Mood and Affect: Mood normal.         Behavior: Behavior normal.           Significant Labs:    BMP:  Recent Labs   Lab 08/21/23  0715 08/22/23  0425    139   K 3.8 4.0    102   CO2 25 23   BUN 14 12   CREATININE 0.9 0.9  No "  CALCIUM 9.3 9.5       CBC:   Recent Labs   Lab 08/21/23  0715 08/22/23  0425   WBC 7.68 18.10*   HGB 14.1 15.2   HCT 40.8 44.6    269       Blood Culture: No results for input(s): "LABBLOO" in the last 168 hours.  Urine Culture: No results for input(s): "LABURIN" in the last 168 hours.  Urine Studies: No results for input(s): "COLORU", "APPEARANCEUA", "PHUR", "SPECGRAV", "PROTEINUA", "GLUCUA", "KETONESU", "BILIRUBINUA", "OCCULTUA", "NITRITE", "UROBILINOGEN", "LEUKOCYTESUR", "RBCUA", "WBCUA", "BACTERIA", "SQUAMEPITHEL", "HYALINECASTS" in the last 168 hours.    Invalid input(s): "WRIGHTSUR"    Significant Imaging:  All pertinent imaging results/findings from the past 24 hours have been reviewed.    "

## 2023-08-22 NOTE — DISCHARGE INSTRUCTIONS
Some blood in the urine is normal and expected.  Drink plenty of fluids.     Some drainage from flank area is normal and expected.  Cover as needed with 4x4s/dressing/tape.    Keep wounds clean and dry. You may shower tomorrow.   No heavy lifting >15lbs or strenuous activity for 1 week. No tub baths or swimming for 2 weeks. Walk daily. Do not drive while taking narcotics.       You are being sent home with a ureteral stent in place.  This is an internal tube that helps your kidney to drain into your bladder.  This can cause some mild flank pain and bladder spasms.  It can also cause some blood in your urine, which is normal.  If you have any questions or concerns regarding your stent, please call the urology office.  THIS STENT IS TEMPORARY AND MUST BE REMOVED WITHIN THE NEXT 3 MONTHS.      Office will call with appointment to have stent removed.      Call the clinic at [phone] or return to the emergency department if you  have persistent fever >101.4, severe bleeding that does not stop, pain not controlled with medications, severe nausea and vomiting limiting intake of liquids and solids, or any other concerns.

## 2023-08-22 NOTE — NURSING
Patient again with sudden onset of pain to right posterior torso from shoulder to waist when attempting to void per urinal. Clear drainage  noted from right  back  dressing.   VSS.  Bladder scan performed: 60ml noted on scan.  Patient states pain is slowly  resolving, now 6/10.     Dr. Petersen updated and will come see patient later.

## 2023-08-22 NOTE — PLAN OF CARE
Patient with sudden onset of sharp pain to right posterior flank area when attempted to void.  Moderate amount of clear drainage noted from nephrostomy insertion site at same time.  No urine per urethra noted.  PRN meds given  Urology team updated.

## 2023-08-23 VITALS
OXYGEN SATURATION: 94 % | TEMPERATURE: 99 F | HEART RATE: 99 BPM | WEIGHT: 227.94 LBS | BODY MASS INDEX: 31.91 KG/M2 | SYSTOLIC BLOOD PRESSURE: 137 MMHG | HEIGHT: 71 IN | DIASTOLIC BLOOD PRESSURE: 84 MMHG | RESPIRATION RATE: 18 BRPM

## 2023-08-23 LAB
ANION GAP SERPL CALC-SCNC: 10 MMOL/L (ref 8–16)
BASOPHILS # BLD AUTO: 0.04 K/UL (ref 0–0.2)
BASOPHILS NFR BLD: 0.3 % (ref 0–1.9)
BUN SERPL-MCNC: 16 MG/DL (ref 6–20)
CALCIUM SERPL-MCNC: 8.9 MG/DL (ref 8.7–10.5)
CHLORIDE SERPL-SCNC: 104 MMOL/L (ref 95–110)
CO2 SERPL-SCNC: 26 MMOL/L (ref 23–29)
CREAT SERPL-MCNC: 0.9 MG/DL (ref 0.5–1.4)
DIFFERENTIAL METHOD: ABNORMAL
EOSINOPHIL # BLD AUTO: 0.2 K/UL (ref 0–0.5)
EOSINOPHIL NFR BLD: 1.7 % (ref 0–8)
ERYTHROCYTE [DISTWIDTH] IN BLOOD BY AUTOMATED COUNT: 13.4 % (ref 11.5–14.5)
EST. GFR  (NO RACE VARIABLE): >60 ML/MIN/1.73 M^2
GLUCOSE SERPL-MCNC: 124 MG/DL (ref 70–110)
HCT VFR BLD AUTO: 42.2 % (ref 40–54)
HGB BLD-MCNC: 14.3 G/DL (ref 14–18)
IMM GRANULOCYTES # BLD AUTO: 0.06 K/UL (ref 0–0.04)
IMM GRANULOCYTES NFR BLD AUTO: 0.4 % (ref 0–0.5)
LYMPHOCYTES # BLD AUTO: 3.1 K/UL (ref 1–4.8)
LYMPHOCYTES NFR BLD: 22.3 % (ref 18–48)
MAGNESIUM SERPL-MCNC: 1.9 MG/DL (ref 1.6–2.6)
MCH RBC QN AUTO: 31.4 PG (ref 27–31)
MCHC RBC AUTO-ENTMCNC: 33.9 G/DL (ref 32–36)
MCV RBC AUTO: 93 FL (ref 82–98)
MONOCYTES # BLD AUTO: 1.2 K/UL (ref 0.3–1)
MONOCYTES NFR BLD: 8.8 % (ref 4–15)
NEUTROPHILS # BLD AUTO: 9.1 K/UL (ref 1.8–7.7)
NEUTROPHILS NFR BLD: 66.5 % (ref 38–73)
NRBC BLD-RTO: 0 /100 WBC
PHOSPHATE SERPL-MCNC: 2.4 MG/DL (ref 2.7–4.5)
PLATELET # BLD AUTO: 215 K/UL (ref 150–450)
PMV BLD AUTO: 11.4 FL (ref 9.2–12.9)
POTASSIUM SERPL-SCNC: 3.7 MMOL/L (ref 3.5–5.1)
RBC # BLD AUTO: 4.55 M/UL (ref 4.6–6.2)
SODIUM SERPL-SCNC: 140 MMOL/L (ref 136–145)
WBC # BLD AUTO: 13.67 K/UL (ref 3.9–12.7)

## 2023-08-23 PROCEDURE — 25000003 PHARM REV CODE 250: Performed by: UROLOGY

## 2023-08-23 PROCEDURE — 25000003 PHARM REV CODE 250: Performed by: STUDENT IN AN ORGANIZED HEALTH CARE EDUCATION/TRAINING PROGRAM

## 2023-08-23 PROCEDURE — 94761 N-INVAS EAR/PLS OXIMETRY MLT: CPT

## 2023-08-23 PROCEDURE — 85025 COMPLETE CBC W/AUTO DIFF WBC: CPT | Performed by: UROLOGY

## 2023-08-23 PROCEDURE — 83735 ASSAY OF MAGNESIUM: CPT | Performed by: UROLOGY

## 2023-08-23 PROCEDURE — 84100 ASSAY OF PHOSPHORUS: CPT | Performed by: UROLOGY

## 2023-08-23 PROCEDURE — 80048 BASIC METABOLIC PNL TOTAL CA: CPT | Performed by: UROLOGY

## 2023-08-23 PROCEDURE — 36415 COLL VENOUS BLD VENIPUNCTURE: CPT | Performed by: UROLOGY

## 2023-08-23 PROCEDURE — 63600175 PHARM REV CODE 636 W HCPCS: Performed by: UROLOGY

## 2023-08-23 RX ORDER — SODIUM,POTASSIUM PHOSPHATES 280-250MG
1 POWDER IN PACKET (EA) ORAL ONCE
Status: COMPLETED | OUTPATIENT
Start: 2023-08-23 | End: 2023-08-23

## 2023-08-23 RX ADMIN — POTASSIUM & SODIUM PHOSPHATES POWDER PACK 280-160-250 MG 1 PACKET: 280-160-250 PACK at 09:08

## 2023-08-23 RX ADMIN — MUPIROCIN: 20 OINTMENT TOPICAL at 09:08

## 2023-08-23 RX ADMIN — CEFAZOLIN SODIUM 2 G: 2 SOLUTION INTRAVENOUS at 01:08

## 2023-08-23 RX ADMIN — OXYCODONE HYDROCHLORIDE 5 MG: 5 TABLET ORAL at 12:08

## 2023-08-23 RX ADMIN — TAMSULOSIN HYDROCHLORIDE 0.4 MG: 0.4 CAPSULE ORAL at 09:08

## 2023-08-23 RX ADMIN — ACETAMINOPHEN 650 MG: 325 TABLET ORAL at 05:08

## 2023-08-23 RX ADMIN — CEFAZOLIN SODIUM 2 G: 2 SOLUTION INTRAVENOUS at 09:08

## 2023-08-23 RX ADMIN — ACETAMINOPHEN 650 MG: 325 TABLET ORAL at 12:08

## 2023-08-23 RX ADMIN — DOCUSATE SODIUM 100 MG: 100 CAPSULE, LIQUID FILLED ORAL at 09:08

## 2023-08-23 NOTE — DISCHARGE SUMMARY
[facility] Surg  Urology  Discharge Summary      Patient Name: Tavo Chadwick  MRN: [id number]  Admission Date: 8/21/2023  Hospital Length of Stay: 2 days  Discharge Date and Time:  08/23/2023 9:33 AM  Attending Physician: Thong Ivey MD   Discharging Provider: Rome Abraham MD  Primary Care Physician: Suzie Reyez MD    HPI:   54 yo male with large right renal stone burden s/p right PCNL 8/21/23 with Dr. Ivey.    Physical Exam  Vitals and nursing note reviewed.   Constitutional:       Appearance: Normal appearance.   HENT:      Head: Atraumatic.      Nose: Nose normal.   Eyes:      Extraocular Movements: Extraocular movements intact.      Pupils: Pupils are equal, round, and reactive to light.   Cardiovascular:      Rate and Rhythm: Normal rate.   Pulmonary:      Effort: Pulmonary effort is normal.   Abdominal:      General: Abdomen is flat. There is no distension.      Tenderness: There is no abdominal tenderness. There is no right CVA tenderness or left CVA tenderness.   Genitourinary:     Comments: Right nephrostomy tube site intact, minimal SS drainage  Musculoskeletal:         General: Normal range of motion.      Cervical back: Normal range of motion.   Skin:     Coloration: Skin is not jaundiced.   Neurological:      General: No focal deficit present.      Mental Status: He is alert and oriented to person, place, and time.   Psychiatric:         Mood and Affect: Mood normal.         Behavior: Behavior normal.           Procedure(s) (LRB):  NEPHROLITHOTOMY, PERCUTANEOUS AND HOMIUM LASER LITHOTRIPSY  W/STONE BASKET EXTRACTIONNEPHROSTOGRAM, DOUBLE J URETERAL STENT PLACEMENT; REMOVAL AND REPLACEMENT OF RIGHT NEPHROSTOMY TUBE ANTEGRADE URETEROSCOPY (Right)  CYSTOURETEROSCOPY,WITH HOLMIUM LASER LITHOTRIPSY OF URETERAL CALCULUS (Right)  EXTRACTION - STONE (Right)  REPLACEMENT, NEPHROSTOMY TUBE, PERCUTANEOUS (Right)     Indwelling Lines/Drains at time of discharge:   Lines/Drains/Airways      None                 Hospital Course (synopsis of major diagnoses, care, treatment, and services provided during the course of the hospital stay): The patient was admitted to [facility] for the above procedure. Patient tolerated the procedure well in its entirety without issue. For more details, please refer to the complete operative note. he was transferred to recovery post-op and then to the floor. Once on the floor his diet was advanced and he ambulated the day after surgery. On POD 1 the patient was tolerating a regular diet, ambulating without difficulty.his pain was well controlled. The nephrostomy tube was removed at bedside due to no IR coverage. Post bedside procedure KUB confirmed ureteral stent in good position. Patient's pain well controlled. Marquez removed at approximately 11 am POD1 and he passed voiding trial with bladder scan PVR by nursing of approx 85 ml. Patient stayed an additional night for monitoring. He continued to void spontaneously and vitals were stable. No post operative fevers. Cr at baseline. WBC downtrending from 18 POD1.    The nephostomy tube site's drainage has significantly decreased, dressed changed multiple time by nursing, greatly appreciate assistance. The patient was deemed stable for discharge on 08/23/2023.  .    Goals of Care Treatment Preferences:  Code Status: Full Code      Consults: None    Significant Diagnostic Studies: None    Pending Diagnostic Studies:     None          Final Active Diagnoses:    Diagnosis Date Noted POA    PRINCIPAL PROBLEM:  Kidney stones [N20.0] 08/09/2023 Yes      Problems Resolved During this Admission:         Discharged Condition: good    Disposition: Home or Self Care    Follow Up:    Patient Instructions:      Notify your health care provider if you experience any of the following:  temperature >100.4     Notify your health care provider if you experience any of the following:  persistent nausea and vomiting or diarrhea     Notify your health  care provider if you experience any of the following:  severe uncontrolled pain     Notify your health care provider if you experience any of the following:  redness, tenderness, or signs of infection (pain, swelling, redness, odor or green/yellow discharge around incision site)     Notify your health care provider if you experience any of the following:  worsening rash     Notify your health care provider if you experience any of the following:  persistent dizziness, light-headedness, or visual disturbances     Medications:  Reconciled Home Medications:      Medication List      START taking these medications    cephALEXin 500 MG capsule  Commonly known as: KEFLEX  Take 1 capsule (500 mg total) by mouth 4 (four) times daily. for 3 days        CHANGE how you take these medications    ondansetron 4 MG Tbdl  Commonly known as: ZOFRAN-ODT  Take 1 tablet (4 mg total) by mouth every 8 (eight) hours as needed (Nausea or vomiting).  What changed: when to take this        CONTINUE taking these medications    oxyCODONE-acetaminophen 5-325 mg per tablet  Commonly known as: PERCOCET  Take 1 tablet by mouth every 4 (four) hours as needed for Pain.     tamsulosin 0.4 mg Cap  Commonly known as: FLOMAX  Take 1 capsule (0.4 mg total) by mouth once daily.            Time spent on the discharge of patient: 10 minutes    Rome Abraham MD  Urology  [facility] Surg

## 2023-08-23 NOTE — PLAN OF CARE
Problem: Adult Inpatient Plan of Care  Goal: Plan of Care Review  Outcome: Ongoing, Progressing  Flowsheets (Taken 8/23/2023 0537)  Plan of Care Reviewed With: patient     Problem: Infection  Goal: Absence of Infection Signs and Symptoms  Outcome: Ongoing, Progressing  Intervention: Prevent or Manage Infection  Flowsheets (Taken 8/23/2023 0537)  Infection Management: aseptic technique maintained     Problem: Fall Injury Risk  Goal: Absence of Fall and Fall-Related Injury  Outcome: Ongoing, Progressing  Intervention: Promote Injury-Free Environment  Flowsheets (Taken 8/23/2023 0537)  Safety Promotion/Fall Prevention: Fall Risk reviewed with patient/family

## 2023-08-23 NOTE — NURSING
Patient with saturated dressing and bedding  from right posterior flank puncture site.  Pt  voided 5cc via urinal.  Post void residual 340cc. Pt with urge to void.     1745: Patient ambulated to restroom, attempted to have BM, but instead able to void clear red urine with out difficulty. Reported Minimal pain. Urge to void relieved.   Post void bladder scan 85cc.     Dr Petersen updated. Marquez to be held for now and patient will stay overnight.

## 2023-08-23 NOTE — PLAN OF CARE
Pt for d/c to home today -   Marquez out -- Nephrostomy tube intact      CM sent message to Urology office /staff per Secure Chat requesting pt be contacted with f/u apt    No dme, no hh ordered   Pt has transportation to home   Discharging nurse to review all d/c meds/instructions.    Future Appointments   Date Time Provider Department Center   9/19/2023  9:00 AM Thong Ivey MD [facility] UROLOGY [facility]        08/23/23 1128   Final Note   Assessment Type Final Discharge Note   Anticipated Discharge Disposition Home   What phone number can be called within the next 1-3 days to see how you are doing after discharge? [phone]   Hospital Resources/Appts/Education Provided Appointments scheduled and added to AVS  (message sent to Urology office requesting apt for pt)   Post-Acute Status   Discharge Delays None known at this time

## 2023-08-23 NOTE — PLAN OF CARE
VN reviewed discharge instructions with pt. And family. Using teach back method.  AVS printed and handed to pt by bedside nurse.  Reviewed follow-up appointments, medications, diet, and importance of medication compliance.  Reviewed home care instructions, treatment plan, self-management, and when to seek medical attention.  Allowed time for questions.  All questions answered.  Patient and family verbalized complete understanding of discharge instructions and voices no concerns.     Discharge instructions complete.  Bedside delivery done.  Transport/wheelchair requested.  Bedside nurse notified.

## 2023-08-24 ENCOUNTER — PATIENT OUTREACH (OUTPATIENT)
Dept: ADMINISTRATIVE | Facility: CLINIC | Age: 53
End: 2023-08-24
Payer: COMMERCIAL

## 2023-08-24 NOTE — PROGRESS NOTES
C3 nurse spoke with Tavo Chadwick for a TCC post hospital discharge follow up call. The patient reports does not have a scheduled HOSFU appointment. C3 nurse was unable to schedule [facility]FU [facility]. Patient advised to contact their PCP to schedule a HOSPFU within 5-7 days.

## 2023-08-25 DIAGNOSIS — N20.0 KIDNEY STONES: Primary | ICD-10-CM

## 2023-08-25 LAB
COMPN STONE: NORMAL
LABORATORY COMMENT REPORT: NORMAL
SPECIMEN SOURCE: NORMAL
STONE ANALYSIS IR-IMP: NORMAL

## 2023-09-15 ENCOUNTER — HOSPITAL ENCOUNTER (OUTPATIENT)
Dept: RADIOLOGY | Facility: HOSPITAL | Age: 53
Discharge: HOME | End: 2023-09-15
Attending: STUDENT IN AN ORGANIZED HEALTH CARE EDUCATION/TRAINING PROGRAM
Payer: COMMERCIAL

## 2023-09-15 DIAGNOSIS — N20.0 KIDNEY STONES: ICD-10-CM

## 2023-09-15 PROCEDURE — 74176 CT ABD & PELVIS W/O CONTRAST: CPT | Mod: 26,,, | Performed by: RADIOLOGY

## 2023-09-15 PROCEDURE — 74176 CT ABD & PELVIS W/O CONTRAST: CPT | Mod: TC

## 2023-09-15 PROCEDURE — 74176 CT ABDOMEN PELVIS WITHOUT CONTRAST: ICD-10-PCS | Mod: 26,,, | Performed by: RADIOLOGY

## 2023-09-19 ENCOUNTER — PROCEDURE VISIT (OUTPATIENT)
Dept: UROLOGY | Facility: CLINIC | Age: 53
End: 2023-09-19
Payer: COMMERCIAL

## 2023-09-19 VITALS
HEART RATE: 78 BPM | WEIGHT: 219.69 LBS | SYSTOLIC BLOOD PRESSURE: 147 MMHG | DIASTOLIC BLOOD PRESSURE: 91 MMHG | HEIGHT: 71 IN | BODY MASS INDEX: 30.76 KG/M2

## 2023-09-19 DIAGNOSIS — N20.0 KIDNEY STONES: Primary | ICD-10-CM

## 2023-09-19 PROCEDURE — 52310 CYSTOSCOPY AND TREATMENT: CPT | Mod: 58,S$GLB,, | Performed by: UROLOGY

## 2023-09-19 PROCEDURE — 52310 PR CYSTOSCOPY,REMV CALCULUS,SIMPLE: ICD-10-PCS | Mod: 58,S$GLB,, | Performed by: UROLOGY

## 2023-09-19 RX ORDER — METFORMIN HYDROCHLORIDE 500 MG/1
500 TABLET ORAL 2 TIMES DAILY
COMMUNITY
Start: 2023-08-30

## 2023-09-19 RX ORDER — LOSARTAN POTASSIUM 25 MG/1
25 TABLET ORAL NIGHTLY
COMMUNITY
Start: 2023-08-30

## 2023-09-19 RX ORDER — AZITHROMYCIN 250 MG/1
TABLET, FILM COATED ORAL
COMMUNITY
Start: 2023-08-28 | End: 2023-11-28

## 2023-09-19 RX ORDER — SULFAMETHOXAZOLE AND TRIMETHOPRIM 800; 160 MG/1; MG/1
1 TABLET ORAL
Status: COMPLETED | OUTPATIENT
Start: 2023-09-19 | End: 2023-09-19

## 2023-09-19 RX ORDER — TAMSULOSIN HYDROCHLORIDE 0.4 MG/1
0.4 CAPSULE ORAL DAILY
Qty: 14 CAPSULE | Refills: 0 | Status: SHIPPED | OUTPATIENT
Start: 2023-09-19 | End: 2023-11-28

## 2023-09-19 RX ORDER — ROSUVASTATIN CALCIUM 10 MG/1
10 TABLET, COATED ORAL
COMMUNITY
Start: 2023-08-29

## 2023-09-19 RX ADMIN — SULFAMETHOXAZOLE AND TRIMETHOPRIM 1 TABLET: 800; 160 TABLET ORAL at 09:09

## 2023-09-19 NOTE — PROCEDURES
Cystoscopy/Stent removal    Date/Time: 9/19/2023 9:00 AM    Performed by: Thong Ivey MD  Authorized by: Thong Ivey MD  Preparation: Patient was prepped and draped in the usual sterile fashion.  Local anesthesia used: yes    Anesthesia:  Local anesthesia used: yes  Local Anesthetic: lidocaine/prilocaine emulsion    Sedation:  Patient sedated: no    Patient tolerance: patient tolerated the procedure well with no immediate complications  Comments: Procedure details:    Patient prepped and draped in normal sterile fashion.  17F flexible cystoscope introduced.  There was some edema around the stent.  The stent was then grasped with grasping forceps and removed in its entirety.  Patient tolerated the procedure well.    Thong Ivey MD

## 2023-09-19 NOTE — PROCEDURES
Simple Urodynamics    Date/Time: 9/19/2023 9:00 AM    Performed by: Thong Ivey MD  Authorized by: Thong Ivey MD  Preparation: Patient was prepped and draped in the usual sterile fashion.  Local anesthesia used: yes (lidocaine jelly)    Anesthesia:  Local anesthesia used: yes (lidocaine jelly)    Sedation:  Patient sedated: no    Patient tolerance: patient tolerated the procedure well with no immediate complications  Comments: Procedure details:    Patient prepped and draped in normal sterile fashion.  17F flexible cystoscope introduced.  There was some edema around the stent.  The stent was then grasped with grasping forceps and removed in its entirety, there was some mild resistance but the stent was removed intact.  Patient tolerated the procedure well.    Thong Ivey MD

## 2023-09-19 NOTE — PROGRESS NOTES
Subjective:       Patient ID: Tavo Chadwick is a 53 y.o. male.    Chief Complaint: No chief complaint on file.     This is a 53 y.o.  male patient that is new to me.  The patient was referred to me by Dr. Good/ED for left UVJ stone and right large stone.  CT 7/22/23 showed left 4mm UVJ stone with mild hydronephrosis, 2.7cm right renal pelvis stone.   Further left-sided pain, did not see stone pass.  He has dull right-sided pain occasionally.  No history of stones.  No blood thinners.  Right PCNL 8/21/23--100% Calcium oxalate monohydrate.   CT 9/15/23 with small right ureteral stones along stent 2 mm or less in size, small right fragment.  Doing well.   D/w patient, removed stent 9/19/23 mild resistance      Lab Results   Component Value Date    CREATININE 0.9 08/23/2023       ---  PMH/PSH/Medications/Allergies/Social history reviewed and as in chart.    Review of Systems   Constitutional:  Negative for activity change, chills and fever.   HENT:  Negative for congestion.    Respiratory:  Negative for cough, chest tightness and shortness of breath.    Cardiovascular:  Negative for chest pain and palpitations.   Gastrointestinal:  Negative for abdominal distention, abdominal pain, nausea and vomiting.   Genitourinary:  Positive for flank pain. Negative for difficulty urinating, hematuria, penile pain, scrotal swelling and testicular pain.   Musculoskeletal:  Negative for gait problem.       Objective:      Physical Exam  HENT:      Head: Atraumatic.   Pulmonary:      Effort: Pulmonary effort is normal.   Neurological:      General: No focal deficit present.      Mental Status: He is alert and oriented to person, place, and time.           Results for orders placed or performed during the hospital encounter of 09/15/23 (from the past 2160 hour(s))   CT Abdomen Pelvis  Without Contrast    Narrative    EXAMINATION:  CT ABDOMEN PELVIS WITHOUT CONTRAST    CLINICAL HISTORY:  Monitoring renal stones s/p PCNL;  Calculus of  kidney    FINDINGS:  Comparison is 08/10/2023.    Lung bases are clear.  The liver, gallbladder, biliary tree, spleen, stomach, pancreas, duodenum, adrenal glands are unremarkable.  There is mild mesenteric adenitis.  No para-aortic or retroperitoneal adenopathy is seen.  No obstruction, ileus, or perforation seen.  There is mild diverticulosis.  The pelvic organs show nothing unusual.  No ascites is seen.  There is a right-sided double-J ureteric stent.  The large right renal calculi is no longer seen.  There are few tiny remaining small right intrarenal calculi.  The left kidney demonstrates nothing unusual.  Along the course of the proximal right double-J ureteric stent there are multiple small calculi/Ta Benjamin.  Bones demonstrate degenerative change.  There is mild right hydronephrosis.      Impression    Proximal right ureter tiny calculi fragments/Ta Benjamin with break-up of the previous right pelvic dominant large renal calculus.  There is some residual right-sided hydronephrosis.    Multiple small right intrarenal calculi.    Mild mesenteric adenitis.    Mild diverticulosis.      Electronically signed by: Chad Reese MD  Date:    09/15/2023  Time:    09:43   Results for orders placed or performed during the hospital encounter of 07/22/23 (from the past 2160 hour(s))   CT Renal Stone Study ABD Pelvis WO    Narrative    EXAMINATION:  CT RENAL STONE STUDY ABD PELVIS WO    CLINICAL HISTORY:  Flank pain, kidney stone suspected;    TECHNIQUE:  Low dose axial images, sagittal and coronal reformations were obtained from the lung bases to the pubic symphysis.  Contrast was not administered.    COMPARISON:  None    FINDINGS:  Kidneys: Large staghorn type stone the right renal pelvis measuring 2.7 cm (series 601, image 91).  Multiple additional right lower pole stones.  No right-sided hydronephrosis.  There is mild left-sided hydronephrosis.  There is a 4 mm stone at the left UVJ.    Pelvis: The bladder and  prostate are unremarkable.  No fluid collections.  No inguinal or pelvic lymphadenopathy.    Abdomen: Hepatomegaly noted measuring 23.9 cm (series 601, image 84).  No liver masses, noting limited assessment without IV contrast.  Gallbladder is unremarkable.  No biliary or pancreatic ductal dilatation.  Borderline splenomegaly measuring 12.7 cm (series 2, image 47).  The adrenal glands are unremarkable.  The abdominal aorta tapers normally.  No periaortic lymphadenopathy.    Bowel: The terminal ileum and appendix are unremarkable.  No dilated loops of bowel.  There is mild fat stranding increased number of small lymph nodes in the mesentery.    Bones: Prominent bilateral degenerative changes of the hips.  No marrow replacement process.    Lung bases: Mild dependent interstitial thickening.      Impression    4 mm stone lodged at the left UVJ with mild left-sided hydronephrosis.    Staghorn type calculus (2.7 cm) in the right renal pelvis.      Electronically signed by: Sai Vazquez MD  Date:    07/22/2023  Time:    08:30       Assessment:     Problem Noted   Kidney Stones 8/9/2023    Left 4 mm UVJ stone with hydronephrosis, right 2.7 cm renal pelvis and right nonobstructing stones on CT 07/22/2023  CT 8/23: left ureteral stone passed, still right stones as above  Right PCNL 8/21/23.          Plan:     D/w patient, removed stent 9/19/23  [place]   Follow up in 4 weeks with FRANCISCO.  [facility] and labs after.   Calcium oxalate handout    Thong Ivey MD    The above referenced imaging and interpretations were personally reviewed.  Disclaimer: This note has been generated using voice-recognition software. There may be typographical errors that have been missed during proof-reading.

## 2023-10-16 ENCOUNTER — HOSPITAL ENCOUNTER (OUTPATIENT)
Dept: RADIOLOGY | Facility: HOSPITAL | Age: 53
Discharge: HOME | End: 2023-10-16
Attending: UROLOGY
Payer: COMMERCIAL

## 2023-10-16 DIAGNOSIS — N20.0 KIDNEY STONES: ICD-10-CM

## 2023-10-16 PROCEDURE — 76770 US EXAM ABDO BACK WALL COMP: CPT | Mod: 26,,, | Performed by: RADIOLOGY

## 2023-10-16 PROCEDURE — 76770 US RETROPERITONEAL COMPLETE: ICD-10-PCS | Mod: 26,,, | Performed by: RADIOLOGY

## 2023-10-16 PROCEDURE — 76770 US EXAM ABDO BACK WALL COMP: CPT | Mod: TC

## 2023-10-17 ENCOUNTER — OFFICE VISIT (OUTPATIENT)
Dept: UROLOGY | Facility: CLINIC | Age: 53
End: 2023-10-17
Payer: COMMERCIAL

## 2023-10-17 VITALS
HEART RATE: 74 BPM | DIASTOLIC BLOOD PRESSURE: 89 MMHG | HEIGHT: 71 IN | WEIGHT: 215.94 LBS | SYSTOLIC BLOOD PRESSURE: 140 MMHG | BODY MASS INDEX: 30.23 KG/M2

## 2023-10-17 DIAGNOSIS — N20.0 KIDNEY STONES: Primary | ICD-10-CM

## 2023-10-17 PROCEDURE — 1159F MED LIST DOCD IN RCRD: CPT | Mod: CPTII,S$GLB,, | Performed by: UROLOGY

## 2023-10-17 PROCEDURE — 3077F PR MOST RECENT SYSTOLIC BLOOD PRESSURE >= 140 MM HG: ICD-10-PCS | Mod: CPTII,S$GLB,, | Performed by: UROLOGY

## 2023-10-17 PROCEDURE — 99999 PR PBB SHADOW E&M-EST. PATIENT-LVL III: CPT | Mod: PBBFAC,,, | Performed by: UROLOGY

## 2023-10-17 PROCEDURE — 1160F PR REVIEW ALL MEDS BY PRESCRIBER/CLIN PHARMACIST DOCUMENTED: ICD-10-PCS | Mod: CPTII,S$GLB,, | Performed by: UROLOGY

## 2023-10-17 PROCEDURE — 4010F ACE/ARB THERAPY RXD/TAKEN: CPT | Mod: CPTII,S$GLB,, | Performed by: UROLOGY

## 2023-10-17 PROCEDURE — 99024 PR POST-OP FOLLOW-UP VISIT: ICD-10-PCS | Mod: S$GLB,,, | Performed by: UROLOGY

## 2023-10-17 PROCEDURE — 99999 PR PBB SHADOW E&M-EST. PATIENT-LVL III: ICD-10-PCS | Mod: PBBFAC,,, | Performed by: UROLOGY

## 2023-10-17 PROCEDURE — 4010F PR ACE/ARB THEARPY RXD/TAKEN: ICD-10-PCS | Mod: CPTII,S$GLB,, | Performed by: UROLOGY

## 2023-10-17 PROCEDURE — 3008F PR BODY MASS INDEX (BMI) DOCUMENTED: ICD-10-PCS | Mod: CPTII,S$GLB,, | Performed by: UROLOGY

## 2023-10-17 PROCEDURE — 3077F SYST BP >= 140 MM HG: CPT | Mod: CPTII,S$GLB,, | Performed by: UROLOGY

## 2023-10-17 PROCEDURE — 3079F DIAST BP 80-89 MM HG: CPT | Mod: CPTII,S$GLB,, | Performed by: UROLOGY

## 2023-10-17 PROCEDURE — 3079F PR MOST RECENT DIASTOLIC BLOOD PRESSURE 80-89 MM HG: ICD-10-PCS | Mod: CPTII,S$GLB,, | Performed by: UROLOGY

## 2023-10-17 PROCEDURE — 3008F BODY MASS INDEX DOCD: CPT | Mod: CPTII,S$GLB,, | Performed by: UROLOGY

## 2023-10-17 PROCEDURE — 1160F RVW MEDS BY RX/DR IN RCRD: CPT | Mod: CPTII,S$GLB,, | Performed by: UROLOGY

## 2023-10-17 PROCEDURE — 99024 POSTOP FOLLOW-UP VISIT: CPT | Mod: S$GLB,,, | Performed by: UROLOGY

## 2023-10-17 PROCEDURE — 1159F PR MEDICATION LIST DOCUMENTED IN MEDICAL RECORD: ICD-10-PCS | Mod: CPTII,S$GLB,, | Performed by: UROLOGY

## 2023-10-17 NOTE — PROGRESS NOTES
Subjective:       Patient ID: Tavo Chadwick is a 53 y.o. male.    Chief Complaint: No chief complaint on file.     This is a 53 y.o.  male patient that is new to me.  The patient was referred to me by Dr. Good/ED for left UVJ stone and right large stone.  CT 7/22/23 showed left 4mm UVJ stone with mild hydronephrosis, 2.7cm right renal pelvis stone.   Further left-sided pain, did not see stone pass.  He has dull right-sided pain occasionally.  No history of stones.  No blood thinners.  Right PCNL 8/21/23--100% Calcium oxalate monohydrate.   CT 9/15/23 with small right ureteral stones along stent 2 mm or less in size, small right fragment.  Doing well.   Removed stent 9/19/23 mild resistance  FRANCISCO 10/23--right small non obstructing fragment, no hydronephrosis  Doing well, passed some small stone fragments after stent removed.  Stone on right appears to be in tract from PCNL, small LP fragment <2-3 mm.        Lab Results   Component Value Date    CREATININE 0.9 08/23/2023       ---  PMH/PSH/Medications/Allergies/Social history reviewed and as in chart.    Review of Systems   Constitutional:  Negative for activity change, chills and fever.   HENT:  Negative for congestion.    Respiratory:  Negative for cough, chest tightness and shortness of breath.    Cardiovascular:  Negative for chest pain and palpitations.   Gastrointestinal:  Negative for abdominal distention, abdominal pain, nausea and vomiting.   Genitourinary:  Negative for difficulty urinating, flank pain, hematuria, penile pain, scrotal swelling and testicular pain.   Musculoskeletal:  Negative for gait problem.       Objective:      Physical Exam  HENT:      Head: Atraumatic.   Pulmonary:      Effort: Pulmonary effort is normal.   Neurological:      General: No focal deficit present.      Mental Status: He is alert and oriented to person, place, and time.           Results for orders placed or performed during the hospital encounter of 09/15/23 (from the past  2160 hour(s))   CT Abdomen Pelvis  Without Contrast    Narrative    EXAMINATION:  CT ABDOMEN PELVIS WITHOUT CONTRAST    CLINICAL HISTORY:  Monitoring renal stones s/p PCNL;  Calculus of kidney    FINDINGS:  Comparison is 08/10/2023.    Lung bases are clear.  The liver, gallbladder, biliary tree, spleen, stomach, pancreas, duodenum, adrenal glands are unremarkable.  There is mild mesenteric adenitis.  No para-aortic or retroperitoneal adenopathy is seen.  No obstruction, ileus, or perforation seen.  There is mild diverticulosis.  The pelvic organs show nothing unusual.  No ascites is seen.  There is a right-sided double-J ureteric stent.  The large right renal calculi is no longer seen.  There are few tiny remaining small right intrarenal calculi.  The left kidney demonstrates nothing unusual.  Along the course of the proximal right double-J ureteric stent there are multiple small calculi/Ta Benjamin.  Bones demonstrate degenerative change.  There is mild right hydronephrosis.      Impression    Proximal right ureter tiny calculi fragments/Ta Benjamin with break-up of the previous right pelvic dominant large renal calculus.  There is some residual right-sided hydronephrosis.    Multiple small right intrarenal calculi.    Mild mesenteric adenitis.    Mild diverticulosis.      Electronically signed by: Chad Reese MD  Date:    09/15/2023  Time:    09:43   Results for orders placed or performed during the hospital encounter of 07/22/23 (from the past 2160 hour(s))   CT Renal Stone Study ABD Pelvis WO    Narrative    EXAMINATION:  CT RENAL STONE STUDY ABD PELVIS WO    CLINICAL HISTORY:  Flank pain, kidney stone suspected;    TECHNIQUE:  Low dose axial images, sagittal and coronal reformations were obtained from the lung bases to the pubic symphysis.  Contrast was not administered.    COMPARISON:  None    FINDINGS:  Kidneys: Large staghorn type stone the right renal pelvis measuring 2.7 cm (series 601, image 91).   Multiple additional right lower pole stones.  No right-sided hydronephrosis.  There is mild left-sided hydronephrosis.  There is a 4 mm stone at the left UVJ.    Pelvis: The bladder and prostate are unremarkable.  No fluid collections.  No inguinal or pelvic lymphadenopathy.    Abdomen: Hepatomegaly noted measuring 23.9 cm (series 601, image 84).  No liver masses, noting limited assessment without IV contrast.  Gallbladder is unremarkable.  No biliary or pancreatic ductal dilatation.  Borderline splenomegaly measuring 12.7 cm (series 2, image 47).  The adrenal glands are unremarkable.  The abdominal aorta tapers normally.  No periaortic lymphadenopathy.    Bowel: The terminal ileum and appendix are unremarkable.  No dilated loops of bowel.  There is mild fat stranding increased number of small lymph nodes in the mesentery.    Bones: Prominent bilateral degenerative changes of the hips.  No marrow replacement process.    Lung bases: Mild dependent interstitial thickening.      Impression    4 mm stone lodged at the left UVJ with mild left-sided hydronephrosis.    Staghorn type calculus (2.7 cm) in the right renal pelvis.      Electronically signed by: Sai Vazquez MD  Date:    07/22/2023  Time:    08:30     FRANCISCO 10/16/23:    Impression:     Nonobstructing right renal stone.     No hydronephrosis.    Assessment:     Problem Noted   Kidney Stones 8/9/2023    Left 4 mm UVJ stone with hydronephrosis, right 2.7 cm renal pelvis and right nonobstructing stones on CT 07/22/2023  CT 8/23: left ureteral stone passed, still right stones as above  Right PCNL 8/21/23.          Plan:     Follow up in 6 weeks with miky whittaker MD    The above referenced imaging and interpretations were personally reviewed.  Disclaimer: This note has been generated using voice-recognition software. There may be typographical errors that have been missed during proof-reading.

## 2023-11-28 ENCOUNTER — HOSPITAL ENCOUNTER (OUTPATIENT)
Dept: RADIOLOGY | Facility: HOSPITAL | Age: 53
Discharge: HOME | End: 2023-11-28
Attending: UROLOGY
Payer: COMMERCIAL

## 2023-11-28 ENCOUNTER — OFFICE VISIT (OUTPATIENT)
Dept: UROLOGY | Facility: CLINIC | Age: 53
End: 2023-11-28
Payer: COMMERCIAL

## 2023-11-28 VITALS
HEIGHT: 71 IN | HEART RATE: 74 BPM | WEIGHT: 213.88 LBS | DIASTOLIC BLOOD PRESSURE: 83 MMHG | SYSTOLIC BLOOD PRESSURE: 115 MMHG | BODY MASS INDEX: 29.94 KG/M2

## 2023-11-28 DIAGNOSIS — N20.0 KIDNEY STONES: Primary | ICD-10-CM

## 2023-11-28 DIAGNOSIS — N20.0 KIDNEY STONES: ICD-10-CM

## 2023-11-28 PROCEDURE — 99214 OFFICE O/P EST MOD 30 MIN: CPT | Mod: S$GLB,,, | Performed by: UROLOGY

## 2023-11-28 PROCEDURE — 3074F SYST BP LT 130 MM HG: CPT | Mod: CPTII,S$GLB,, | Performed by: UROLOGY

## 2023-11-28 PROCEDURE — 4010F PR ACE/ARB THEARPY RXD/TAKEN: ICD-10-PCS | Mod: CPTII,S$GLB,, | Performed by: UROLOGY

## 2023-11-28 PROCEDURE — 4010F ACE/ARB THERAPY RXD/TAKEN: CPT | Mod: CPTII,S$GLB,, | Performed by: UROLOGY

## 2023-11-28 PROCEDURE — 3008F BODY MASS INDEX DOCD: CPT | Mod: CPTII,S$GLB,, | Performed by: UROLOGY

## 2023-11-28 PROCEDURE — 3008F PR BODY MASS INDEX (BMI) DOCUMENTED: ICD-10-PCS | Mod: CPTII,S$GLB,, | Performed by: UROLOGY

## 2023-11-28 PROCEDURE — 99999 PR PBB SHADOW E&M-EST. PATIENT-LVL III: ICD-10-PCS | Mod: PBBFAC,,, | Performed by: UROLOGY

## 2023-11-28 PROCEDURE — 74018 RADEX ABDOMEN 1 VIEW: CPT | Mod: TC,FY

## 2023-11-28 PROCEDURE — 1159F PR MEDICATION LIST DOCUMENTED IN MEDICAL RECORD: ICD-10-PCS | Mod: CPTII,S$GLB,, | Performed by: UROLOGY

## 2023-11-28 PROCEDURE — 99999 PR PBB SHADOW E&M-EST. PATIENT-LVL III: CPT | Mod: PBBFAC,,, | Performed by: UROLOGY

## 2023-11-28 PROCEDURE — 99214 PR OFFICE/OUTPT VISIT, EST, LEVL IV, 30-39 MIN: ICD-10-PCS | Mod: S$GLB,,, | Performed by: UROLOGY

## 2023-11-28 PROCEDURE — 74018 RADEX ABDOMEN 1 VIEW: CPT | Mod: 26,,, | Performed by: RADIOLOGY

## 2023-11-28 PROCEDURE — 1159F MED LIST DOCD IN RCRD: CPT | Mod: CPTII,S$GLB,, | Performed by: UROLOGY

## 2023-11-28 PROCEDURE — 3074F PR MOST RECENT SYSTOLIC BLOOD PRESSURE < 130 MM HG: ICD-10-PCS | Mod: CPTII,S$GLB,, | Performed by: UROLOGY

## 2023-11-28 PROCEDURE — 74018 XR ABDOMEN AP 1 VIEW: ICD-10-PCS | Mod: 26,,, | Performed by: RADIOLOGY

## 2023-11-28 PROCEDURE — 3079F DIAST BP 80-89 MM HG: CPT | Mod: CPTII,S$GLB,, | Performed by: UROLOGY

## 2023-11-28 PROCEDURE — 3079F PR MOST RECENT DIASTOLIC BLOOD PRESSURE 80-89 MM HG: ICD-10-PCS | Mod: CPTII,S$GLB,, | Performed by: UROLOGY

## 2023-11-28 PROCEDURE — 1160F RVW MEDS BY RX/DR IN RCRD: CPT | Mod: CPTII,S$GLB,, | Performed by: UROLOGY

## 2023-11-28 PROCEDURE — 1160F PR REVIEW ALL MEDS BY PRESCRIBER/CLIN PHARMACIST DOCUMENTED: ICD-10-PCS | Mod: CPTII,S$GLB,, | Performed by: UROLOGY

## 2023-11-28 NOTE — PROGRESS NOTES
Subjective:       Patient ID: Tavo Chadwick is a 53 y.o. male.    Chief Complaint: No chief complaint on file.     This is a 53 y.o.  male patient that is new to me.  The patient was referred to me by Dr. Good/ED for left UVJ stone and right large stone.  CT 7/22/23 showed left 4mm UVJ stone with mild hydronephrosis, 2.7cm right renal pelvis stone.   Further left-sided pain, did not see stone pass.  He has dull right-sided pain occasionally.  No history of stones.  No blood thinners.  Right PCNL 8/21/23--100% Calcium oxalate monohydrate.   CT 9/15/23 with small right ureteral stones along stent 2 mm or less in size, small right fragment.  Doing well.   Removed stent 9/19/23 mild resistance  FRANCISCO 10/23--right small non obstructing fragment, no hydronephrosis  Doing well, passed some small stone fragments after stent removed.  Stone on right appears to be in tract from PCNL, small LP fragment <2-3 mm.      [facility]:  10/23--volume 3.92 (L/day), SS CaOx 3.55 (normal 6-10), Calcium 163 (mg/day, normal: <250 male, <200 female), oxalate 47 (mg/day, 20-40 normal) , Citrate 695 (mg/day, normal: male >450, female >550), SS CaP 0.32 (normal 0.5-2), pH 6.437 (normal 5.8-6.2), SS uric acid 0.14 (0-1), uric acid 0.619 (g/day, normal: male <0.8, female <0.75).  --  high oxalate discussed changes in diet, borderline high PH.  Will hold on potassium citrate or thiazide given relatively normal [facility].        Lab Results   Component Value Date    CREATININE 0.9 08/23/2023       ---  PMH/PSH/Medications/Allergies/Social history reviewed and as in chart.    Review of Systems   Constitutional:  Negative for activity change, chills and fever.   HENT:  Negative for congestion.    Respiratory:  Negative for cough, chest tightness and shortness of breath.    Cardiovascular:  Negative for chest pain and palpitations.   Gastrointestinal:  Negative for abdominal distention, abdominal pain, nausea and vomiting.   Genitourinary:  Negative  for difficulty urinating, flank pain, hematuria, penile pain, scrotal swelling and testicular pain.   Musculoskeletal:  Negative for gait problem.       Objective:      Physical Exam  HENT:      Head: Atraumatic.   Pulmonary:      Effort: Pulmonary effort is normal.   Neurological:      General: No focal deficit present.      Mental Status: He is alert and oriented to person, place, and time.           Results for orders placed or performed during the hospital encounter of 09/15/23 (from the past 2160 hour(s))   CT Abdomen Pelvis  Without Contrast    Narrative    EXAMINATION:  CT ABDOMEN PELVIS WITHOUT CONTRAST    CLINICAL HISTORY:  Monitoring renal stones s/p PCNL;  Calculus of kidney    FINDINGS:  Comparison is 08/10/2023.    Lung bases are clear.  The liver, gallbladder, biliary tree, spleen, stomach, pancreas, duodenum, adrenal glands are unremarkable.  There is mild mesenteric adenitis.  No para-aortic or retroperitoneal adenopathy is seen.  No obstruction, ileus, or perforation seen.  There is mild diverticulosis.  The pelvic organs show nothing unusual.  No ascites is seen.  There is a right-sided double-J ureteric stent.  The large right renal calculi is no longer seen.  There are few tiny remaining small right intrarenal calculi.  The left kidney demonstrates nothing unusual.  Along the course of the proximal right double-J ureteric stent there are multiple small calculi/Ta Benjamin.  Bones demonstrate degenerative change.  There is mild right hydronephrosis.      Impression    Proximal right ureter tiny calculi fragments/Ta Benjamin with break-up of the previous right pelvic dominant large renal calculus.  There is some residual right-sided hydronephrosis.    Multiple small right intrarenal calculi.    Mild mesenteric adenitis.    Mild diverticulosis.      Electronically signed by: Chad Reese MD  Date:    09/15/2023  Time:    09:43     FRANCISCO 10/16/23:    Impression:     Nonobstructing right renal  stone.     No hydronephrosis.    Assessment:     Problem Noted   Kidney Stones 8/9/2023    Left 4 mm UVJ stone with hydronephrosis, right 2.7 cm renal pelvis and right nonobstructing stones on CT 07/22/2023  CT 8/23: left ureteral stone passed, still right stones as above  Right PCNL 8/21/23.          Plan:       [facility] reviewed in detail, to go downstairs and have labs done.  No need for medical intervention at current.  Discussed dietary changes (limiting not send his case) to decrease oxalate.    Follow up in 6 months with renal ultrasound and x-ray      Thong Ivey MD    The above referenced imaging and interpretations were personally reviewed.  Disclaimer: This note has been generated using voice-recognition software. There may be typographical errors that have been missed during proof-reading.

## 2024-05-28 ENCOUNTER — HOSPITAL ENCOUNTER (OUTPATIENT)
Dept: RADIOLOGY | Facility: HOSPITAL | Age: 54
Discharge: HOME | End: 2024-05-28
Attending: UROLOGY
Payer: COMMERCIAL

## 2024-05-28 DIAGNOSIS — N20.0 KIDNEY STONES: ICD-10-CM

## 2024-05-28 PROCEDURE — 76770 US EXAM ABDO BACK WALL COMP: CPT | Mod: TC

## 2024-05-28 PROCEDURE — 76770 US EXAM ABDO BACK WALL COMP: CPT | Mod: 26,,, | Performed by: RADIOLOGY

## 2024-05-30 ENCOUNTER — OFFICE VISIT (OUTPATIENT)
Dept: UROLOGY | Facility: CLINIC | Age: 54
End: 2024-05-30
Payer: COMMERCIAL

## 2024-05-30 VITALS
HEART RATE: 73 BPM | HEIGHT: 71 IN | DIASTOLIC BLOOD PRESSURE: 90 MMHG | WEIGHT: 215.5 LBS | BODY MASS INDEX: 30.17 KG/M2 | SYSTOLIC BLOOD PRESSURE: 130 MMHG

## 2024-05-30 DIAGNOSIS — N20.0 KIDNEY STONES: Primary | ICD-10-CM

## 2024-05-30 PROCEDURE — 4010F ACE/ARB THERAPY RXD/TAKEN: CPT | Mod: CPTII,S$GLB,, | Performed by: UROLOGY

## 2024-05-30 PROCEDURE — 99999 PR PBB SHADOW E&M-EST. PATIENT-LVL III: CPT | Mod: PBBFAC,,, | Performed by: UROLOGY

## 2024-05-30 PROCEDURE — 3008F BODY MASS INDEX DOCD: CPT | Mod: CPTII,S$GLB,, | Performed by: UROLOGY

## 2024-05-30 PROCEDURE — 99213 OFFICE O/P EST LOW 20 MIN: CPT | Mod: S$GLB,,, | Performed by: UROLOGY

## 2024-05-30 PROCEDURE — 1160F RVW MEDS BY RX/DR IN RCRD: CPT | Mod: CPTII,S$GLB,, | Performed by: UROLOGY

## 2024-05-30 PROCEDURE — 3080F DIAST BP >= 90 MM HG: CPT | Mod: CPTII,S$GLB,, | Performed by: UROLOGY

## 2024-05-30 PROCEDURE — 3075F SYST BP GE 130 - 139MM HG: CPT | Mod: CPTII,S$GLB,, | Performed by: UROLOGY

## 2024-05-30 PROCEDURE — 1159F MED LIST DOCD IN RCRD: CPT | Mod: CPTII,S$GLB,, | Performed by: UROLOGY

## 2024-05-30 NOTE — PROGRESS NOTES
Subjective:       Patient ID: Tavo Chadwick is a 54 y.o. male.    Chief Complaint: No chief complaint on file.     This is a 54 y.o.  male patient that is new to me.  The patient was referred to me by Dr. Good/ED for left UVJ stone and right large stone.  CT 7/22/23 showed left 4mm UVJ stone with mild hydronephrosis, 2.7cm right renal pelvis stone.   Further left-sided pain, did not see stone pass.  He has dull right-sided pain occasionally.  No history of stones.  No blood thinners.  Right PCNL 8/21/23--100% Calcium oxalate monohydrate.   CT 9/15/23 with small right ureteral stones along stent 2 mm or less in size, small right fragment.  Doing well.   Removed stent 9/19/23 mild resistance  FRANCISCO 10/23--right small non obstructing fragment, no hydronephrosis  Doing well, passed some small stone fragments after stent removed.  Stone on right appears to be in tract from PCNL, small LP fragment <2-3 mm.    FRANCISCO 5/2024: small right non obstructing stones up to 6 mm, no hydronephrosis, stones not seen on KUB.    No pain.     [facility]:  10/23--volume 3.92 (L/day), SS CaOx 3.55 (normal 6-10), Calcium 163 (mg/day, normal: <250 male, <200 female), oxalate 47 (mg/day, 20-40 normal) , Citrate 695 (mg/day, normal: male >450, female >550), SS CaP 0.32 (normal 0.5-2), pH 6.437 (normal 5.8-6.2), SS uric acid 0.14 (0-1), uric acid 0.619 (g/day, normal: male <0.8, female <0.75).  --  high oxalate discussed changes in diet, borderline high PH.  Will hold on potassium citrate or thiazide given relatively normal [facility].        Lab Results   Component Value Date    CREATININE 0.9 11/28/2023       ---  PMH/PSH/Medications/Allergies/Social history reviewed and as in chart.    Review of Systems   Constitutional:  Negative for activity change, chills and fever.   HENT:  Negative for congestion.    Respiratory:  Negative for cough, chest tightness and shortness of breath.    Cardiovascular:  Negative for chest pain and palpitations.    Gastrointestinal:  Negative for abdominal distention, abdominal pain, nausea and vomiting.   Genitourinary:  Negative for difficulty urinating, flank pain, hematuria, penile pain, scrotal swelling and testicular pain.   Musculoskeletal:  Negative for gait problem.       Objective:      Physical Exam  HENT:      Head: Atraumatic.   Pulmonary:      Effort: Pulmonary effort is normal.   Neurological:      General: No focal deficit present.      Mental Status: He is alert and oriented to person, place, and time.               FRANCISCO 10/16/23:    Impression:     Nonobstructing right renal stone.     No hydronephrosis.    FRANCISCO 5/2024:    Right non obstructing stones. No hydro.      Assessment:     Problem Noted   Kidney Stones 8/9/2023    Left 4 mm UVJ stone with hydronephrosis, right 2.7 cm renal pelvis and right nonobstructing stones on CT 07/22/2023  CT 8/23: left ureteral stone passed, still right stones as above  Right PCNL 8/21/23.          Plan:       Doing well  Discussed FRANCISCO with small right non obstructing stones.  Discussed URS vs observation.  Wishes to observe.  Follow up in 6 months with FRANCISCO and JASMEET Ivey MD    The above referenced imaging and interpretations were personally reviewed.  Disclaimer: This note has been generated using voice-recognition software. There may be typographical errors that have been missed during proof-reading.

## 2024-08-19 ENCOUNTER — TELEPHONE (OUTPATIENT)
Dept: ENDOSCOPY | Facility: HOSPITAL | Age: 54
End: 2024-08-19
Payer: COMMERCIAL

## 2024-08-19 NOTE — TELEPHONE ENCOUNTER
Call Center scheduling staff called to assist pt to schedule for endoscopy in [place]. No orders found in Epic. She will have pt reach out to PCP for orders. Gave concirge info in case these orders are coming from out of [facility].

## 2024-11-05 ENCOUNTER — PATIENT MESSAGE (OUTPATIENT)
Dept: RESEARCH | Facility: HOSPITAL | Age: 54
End: 2024-11-05
Payer: COMMERCIAL

## 2024-12-02 ENCOUNTER — HOSPITAL ENCOUNTER (OUTPATIENT)
Dept: RADIOLOGY | Facility: HOSPITAL | Age: 54
Discharge: HOME | End: 2024-12-02
Attending: UROLOGY
Payer: COMMERCIAL

## 2024-12-02 DIAGNOSIS — N20.0 KIDNEY STONES: ICD-10-CM

## 2024-12-02 PROCEDURE — 76770 US EXAM ABDO BACK WALL COMP: CPT | Mod: TC

## 2024-12-02 PROCEDURE — 76770 US EXAM ABDO BACK WALL COMP: CPT | Mod: 26,,, | Performed by: RADIOLOGY

## 2024-12-02 PROCEDURE — 74018 RADEX ABDOMEN 1 VIEW: CPT | Mod: 26,,, | Performed by: RADIOLOGY

## 2024-12-02 PROCEDURE — 74018 RADEX ABDOMEN 1 VIEW: CPT | Mod: TC,FY

## 2025-02-26 ENCOUNTER — PATIENT MESSAGE (OUTPATIENT)
Dept: ENDOSCOPY | Facility: HOSPITAL | Age: 55
End: 2025-02-26
Payer: COMMERCIAL

## 2025-02-26 ENCOUNTER — TELEPHONE (OUTPATIENT)
Dept: ENDOSCOPY | Facility: HOSPITAL | Age: 55
End: 2025-02-26
Payer: COMMERCIAL

## 2025-02-26 VITALS — WEIGHT: 215 LBS | BODY MASS INDEX: 30.1 KG/M2 | HEIGHT: 71 IN

## 2025-02-26 DIAGNOSIS — Z12.11 ENCOUNTER FOR SCREENING COLONOSCOPY: Primary | ICD-10-CM

## 2025-02-26 RX ORDER — SODIUM, POTASSIUM,MAG SULFATES 17.5-3.13G
1 SOLUTION, RECONSTITUTED, ORAL ORAL DAILY
Qty: 1 KIT | Refills: 0 | Status: SHIPPED | OUTPATIENT
Start: 2025-02-26 | End: 2025-02-28

## 2025-02-26 NOTE — TELEPHONE ENCOUNTER
Pt calling to r/s c-scope as there is a holiday that coincides.  Referral for procedure from Case request      Spoke to pt to reschedule procedure(s) Colonoscopy       Physician to perform procedure(s) Dr. NATALY Young  Date of Procedure (s) 4/7/24  Arrival Time 9:30 AM  Time of Procedure(s) 10:30 AM   Location of Procedure(s) [place] [address]nd Floor  Type of Rx Prep sent to patient: Suprep  Instructions provided to patient via [facility]    Patient was informed on the following information and verbalized understanding. Screening questionnaire reviewed with patient and complete. If procedure requires anesthesia, a responsible adult needs to be present to accompany the patient home, patient cannot drive after receiving anesthesia. Appointment details are tentative, especially check-in time. Patient will receive a prep-op call 7 days prior to confirm check-in time for procedure. If applicable the patient should contact their pharmacy to verify Rx for procedure prep is ready for pick-up. Patient was advised to call the scheduling department at [phone] if pharmacy states no Rx is available. Patient was advised to call the endoscopy scheduling department if any questions or concerns arise.      [facility] Scheduling Department

## 2025-02-26 NOTE — TELEPHONE ENCOUNTER
Referral for procedure from outside referral      Spoke to patient to schedule procedure(s) Colonoscopy       Physician to perform procedure(s) Dr. VANESSA Og  Date of Procedure (s) 3/31/25  Arrival Time 10:00 AM  Time of Procedure(s) 11:00 AM   Location of Procedure(s) [place] [address] Floor  Type of Rx Prep sent to patient: Suprep  Instructions provided to patient via [facility]    Patient was informed on the following information and verbalized understanding. Screening questionnaire reviewed with patient and complete. If procedure requires anesthesia, a responsible adult needs to be present to accompany the patient home, patient cannot drive after receiving anesthesia. Appointment details are tentative, especially check-in time. Patient will receive a prep-op call 7 days prior to confirm check-in time for procedure. If applicable the patient should contact their pharmacy to verify Rx for procedure prep is ready for pick-up. Patient was advised to call the scheduling department at [phone] if pharmacy states no Rx is available. Patient was advised to call the endoscopy scheduling department if any questions or concerns arise.       Endoscopy Scheduling Department        From: Amadou Preeira   Sent: 2/12/2025   3:45 PM CST   To: [facility] Endoscopy Schedulers     Good afternoon,      The pt listed above is being referred from Suzie Reyez for (Colon ca screening). I have scanned the referral and records into . Please advise or contact pt to schedule appt at your earliest convenience.      Thank You,      Amadou Pereira   [facility]

## 2025-03-25 ENCOUNTER — ANESTHESIA EVENT (OUTPATIENT)
Dept: ENDOSCOPY | Facility: HOSPITAL | Age: 55
End: 2025-03-25
Payer: COMMERCIAL

## 2025-03-25 NOTE — ANESTHESIA PREPROCEDURE EVALUATION
03/25/2025  Tavo Chadwick is a 55 y.o., male.  [facility]  Anesthesia Pre-Operative Evaluation       Patient Name: Tavo Chadwick  YOB: 1970  MRN: [id number]  [facility]: [id number]      Code Status: Prior   Date of Procedure: 4/7/2025  Anesthesia: Choice Procedure: Procedure(s) (LRB):  COLONOSCOPY, SCREENING, LOW RISK PATIENT (N/A)  Pre-Operative Diagnosis: Encounter for screening colonoscopy [Z12.11]  Proceduralist: Surgeons and Role:     * Martin Young MD - Primary        SUBJECTIVE:   Tavo Chadwcik is a 55 y.o. male who  has no past medical history on file..     he has a current medication list which includes the following long-term medication(s): losartan, metformin, and rosuvastatin.     ALLERGIES:   Review of patient's allergies indicates:  No Known Allergies  LDA:          Lines/Drains/Airways       None                  Anesthesia Evaluation      Airway   Mallampati: II  TM distance: Normal  Dental      Pulmonary    Cardiovascular     Rate: Normal    Neuro/Psych      GI/Hepatic/Renal    (-) renal disease    Endo/Other    Abdominal                   MEDICATIONS:     Antibiotics (From admission, onward)      None          VTE Risk Mitigation (From admission, onward)      None              Current Medications[1]       History:   There are no hospital problems to display for this patient.    Surgical History:    has a past surgical history that includes Percutaneous nephrolithotomy (Right, 8/21/2023); cystoureteroscopy,with holmium laser lithotripsy of ureteral calculus (Right, 8/21/2023); extraction - stone (Right, 8/21/2023); and Percutaneous replacement of nephrostomy tube (Right, 8/21/2023).   Social History:    has no history on file for sexual activity.  reports that he has never smoked. He has never used smokeless tobacco. He reports that he does not currently use alcohol. He  "reports that he does not use drugs.     OBJECTIVE:     Vital Signs (Most Recent):    Vital Signs Range (Last 24H):          There is no height or weight on file to calculate BMI.   Wt Readings from Last 4 Encounters:   02/26/25 97.5 kg (215 lb)   05/30/24 97.7 kg (215 lb 8 oz)   11/28/23 97 kg (213 lb 13.5 oz)   10/17/23 98 kg (215 lb 15.1 oz)       Significant Labs:  Lab Results   Component Value Date    WBC 7.20 11/28/2023    HGB 14.0 11/28/2023    HCT 41.5 11/28/2023     11/28/2023     11/28/2023    K 4.6 11/28/2023     11/28/2023    CREATININE 0.9 11/28/2023    BUN 17 11/28/2023    CO2 25 11/28/2023    GLU 95 11/28/2023    CALCIUM 9.6 11/28/2023    MG 1.9 08/23/2023    PHOS 2.4 (L) 08/23/2023    ALKPHOS 49 (L) 11/28/2023    ALT 20 11/28/2023    AST 20 11/28/2023    ALBUMIN 4.0 11/28/2023    INR 1.0 11/28/2023    TROPONINI <0.006 07/22/2023     No LMP for male patient.  No results found for this or any previous visit (from the past 72 hours).    EKG:   Results for orders placed or performed during the hospital encounter of 07/22/23   EKG 12-lead    Collection Time: 07/22/23  7:14 AM    Narrative    Test Reason : R07.9,    Vent. Rate : 077 BPM     Atrial Rate : 077 BPM     P-R Int : 154 ms          QRS Dur : 094 ms      QT Int : 394 ms       P-R-T Axes : 079 087 091 degrees     QTc Int : 445 ms    Normal sinus rhythm  Normal ECG  No previous ECGs available  Confirmed by Arely Martinez MD ([id number]) on 7/24/2023 2:25:22 PM    Referred By: [email or web address]   SELF           Confirmed By:Arely Martinez MD       TTE:  No results found for this or any previous visit.  No results found for: "EF"   No results found for this or any previous visit.  FATEMEH:  No results found for this or any previous visit.  Stress Test:  No results found for this or any previous visit.     LHC:  No results found for this or any previous visit.     PFT:  No results found for: "FEV1", "FVC", "KMO5JKM", "TLC", "DLCO"     ASSESSMENT/PLAN: "         Pre-op Assessment    I have reviewed the Patient Summary Reports.     I have reviewed the Nursing Notes. I have reviewed the NPO Status.   I have reviewed the Medications.     Review of Systems  Anesthesia Hx:  No problems with previous Anesthesia             Denies Family Hx of Anesthesia complications.    Denies Personal Hx of Anesthesia complications.                    Social:  Non-Smoker, No Alcohol Use       Hematology/Oncology:  Hematology Normal   Oncology Normal                                   EENT/Dental:  EENT/Dental Normal           Cardiovascular:  Cardiovascular Normal                                              Pulmonary:  Pulmonary Normal                       Renal/:   Denies Chronic Renal Disease. renal calculi               Hepatic/GI:  Hepatic/GI Normal                    Musculoskeletal:  Musculoskeletal Normal                Neurological:  Neurology Normal                                      Endocrine:  Endocrine Normal          Obesity / BMI > 30  Dermatological:  Skin Normal    Psych:  Psychiatric Normal                  Physical Exam  General: Well nourished    Airway:  Mallampati: II   Mouth Opening: Normal  TM Distance: Normal    Chest/Lungs:  Normal Respiratory Rate    Heart:  Rate: Normal    Anesthesia Plan  Type of Anesthesia, risks & benefits discussed:    Anesthesia Type: Gen Natural Airway  Intra-op Monitoring Plan: Standard ASA Monitors  Induction:  IV  Informed Consent: Informed consent signed with the Patient and all parties understand the risks and agree with anesthesia plan.  All questions answered. Patient consented to blood products? No  ASA Score: 2  Day of Surgery Review of History & Physical: H&P Update referred to the surgeon/provider.    Ready For Surgery From Anesthesia Perspective.     .             [1]  No current facility-administered medications for this encounter.     Current Outpatient Medications   Medication Sig Dispense Refill    losartan (COZAAR)  25 MG tablet Take 25 mg by mouth every evening.      metFORMIN (GLUCOPHAGE) 500 MG tablet Take 500 mg by mouth 2 (two) times daily.      rosuvastatin (CRESTOR) 10 MG tablet Take 10 mg by mouth.

## 2025-04-07 ENCOUNTER — HOSPITAL ENCOUNTER (OUTPATIENT)
Facility: HOSPITAL | Age: 55
Discharge: HOME | End: 2025-04-07
Attending: STUDENT IN AN ORGANIZED HEALTH CARE EDUCATION/TRAINING PROGRAM | Admitting: STUDENT IN AN ORGANIZED HEALTH CARE EDUCATION/TRAINING PROGRAM
Payer: COMMERCIAL

## 2025-04-07 ENCOUNTER — ANESTHESIA (OUTPATIENT)
Dept: ENDOSCOPY | Facility: HOSPITAL | Age: 55
End: 2025-04-07
Payer: COMMERCIAL

## 2025-04-07 VITALS
SYSTOLIC BLOOD PRESSURE: 128 MMHG | OXYGEN SATURATION: 96 % | HEART RATE: 74 BPM | RESPIRATION RATE: 19 BRPM | TEMPERATURE: 98 F | DIASTOLIC BLOOD PRESSURE: 81 MMHG

## 2025-04-07 DIAGNOSIS — Z12.11 ENCOUNTER FOR SCREENING COLONOSCOPY: ICD-10-CM

## 2025-04-07 LAB — POCT GLUCOSE: 112 MG/DL (ref 70–110)

## 2025-04-07 PROCEDURE — 37000008 HC ANESTHESIA 1ST 15 MINUTES: Performed by: STUDENT IN AN ORGANIZED HEALTH CARE EDUCATION/TRAINING PROGRAM

## 2025-04-07 PROCEDURE — 37000009 HC ANESTHESIA EA ADD 15 MINS: Performed by: STUDENT IN AN ORGANIZED HEALTH CARE EDUCATION/TRAINING PROGRAM

## 2025-04-07 PROCEDURE — 63600175 PHARM REV CODE 636 W HCPCS: Performed by: NURSE ANESTHETIST, CERTIFIED REGISTERED

## 2025-04-07 PROCEDURE — 82962 GLUCOSE BLOOD TEST: CPT | Performed by: STUDENT IN AN ORGANIZED HEALTH CARE EDUCATION/TRAINING PROGRAM

## 2025-04-07 PROCEDURE — 45385 COLONOSCOPY W/LESION REMOVAL: CPT | Mod: 33 | Performed by: STUDENT IN AN ORGANIZED HEALTH CARE EDUCATION/TRAINING PROGRAM

## 2025-04-07 PROCEDURE — 88305 TISSUE EXAM BY PATHOLOGIST: CPT | Mod: TC | Performed by: STUDENT IN AN ORGANIZED HEALTH CARE EDUCATION/TRAINING PROGRAM

## 2025-04-07 PROCEDURE — 99900035 HC TECH TIME PER 15 MIN (STAT)

## 2025-04-07 PROCEDURE — 27201089 HC SNARE, DISP (ANY): Performed by: STUDENT IN AN ORGANIZED HEALTH CARE EDUCATION/TRAINING PROGRAM

## 2025-04-07 PROCEDURE — 94761 N-INVAS EAR/PLS OXIMETRY MLT: CPT

## 2025-04-07 PROCEDURE — 25000003 PHARM REV CODE 250: Performed by: STUDENT IN AN ORGANIZED HEALTH CARE EDUCATION/TRAINING PROGRAM

## 2025-04-07 PROCEDURE — 45385 COLONOSCOPY W/LESION REMOVAL: CPT | Mod: 33,,, | Performed by: STUDENT IN AN ORGANIZED HEALTH CARE EDUCATION/TRAINING PROGRAM

## 2025-04-07 RX ORDER — SODIUM CHLORIDE 9 MG/ML
INJECTION, SOLUTION INTRAVENOUS CONTINUOUS
Status: DISCONTINUED | OUTPATIENT
Start: 2025-04-07 | End: 2025-04-07 | Stop reason: HOSPADM

## 2025-04-07 RX ORDER — PROPOFOL 10 MG/ML
INJECTION, EMULSION INTRAVENOUS CONTINUOUS PRN
Status: DISCONTINUED | OUTPATIENT
Start: 2025-04-07 | End: 2025-04-07

## 2025-04-07 RX ORDER — LIDOCAINE HYDROCHLORIDE 20 MG/ML
INJECTION, SOLUTION EPIDURAL; INFILTRATION; INTRACAUDAL; PERINEURAL
Status: DISCONTINUED | OUTPATIENT
Start: 2025-04-07 | End: 2025-04-07

## 2025-04-07 RX ORDER — PROPOFOL 10 MG/ML
VIAL (ML) INTRAVENOUS
Status: DISCONTINUED | OUTPATIENT
Start: 2025-04-07 | End: 2025-04-07

## 2025-04-07 RX ADMIN — PROPOFOL 150 MCG/KG/MIN: 10 INJECTION, EMULSION INTRAVENOUS at 10:04

## 2025-04-07 RX ADMIN — LIDOCAINE HYDROCHLORIDE 100 MG: 20 INJECTION, SOLUTION EPIDURAL; INFILTRATION; INTRACAUDAL; PERINEURAL at 10:04

## 2025-04-07 RX ADMIN — PROPOFOL 80 MG: 10 INJECTION, EMULSION INTRAVENOUS at 10:04

## 2025-04-07 RX ADMIN — SODIUM CHLORIDE: 0.9 INJECTION, SOLUTION INTRAVENOUS at 10:04

## 2025-04-07 NOTE — TRANSFER OF CARE
Anesthesia Transfer of Care Note    Patient: Tavo Chadwick    Procedure(s) Performed: Procedure(s) (LRB):  COLONOSCOPY, SCREENING, LOW RISK PATIENT (N/A)    Patient location: PACU    Anesthesia Type: general    Transport from OR: Transported from OR on room air with adequate spontaneous ventilation    Post pain: adequate analgesia    Post assessment: no apparent anesthetic complications    Post vital signs: stable    Level of consciousness: sedated    Nausea/Vomiting: no nausea/vomiting    Complications: none    Transfer of care protocol was followed      Last vitals: Visit Vitals  /78   Pulse 79   Temp 36   Resp 13   SpO2 97%

## 2025-04-07 NOTE — ANESTHESIA POSTPROCEDURE EVALUATION
Anesthesia Post Evaluation    Patient: Tavo Chadwick    Procedure(s) Performed: Procedure(s) (LRB):  COLONOSCOPY, SCREENING, LOW RISK PATIENT (N/A)    Final Anesthesia Type: general      Patient location during evaluation: PACU  Patient participation: Yes- Able to Participate  Level of consciousness: awake and alert  Post-procedure vital signs: reviewed and stable  Pain management: adequate  Airway patency: patent    PONV status at discharge: No PONV  Anesthetic complications: no      Cardiovascular status: stable  Respiratory status: spontaneous ventilation  Hydration status: euvolemic  Follow-up not needed.          Vitals Value Taken Time   /81 04/07/25 11:17   Temp 36.7 °C (98.1 °F) 04/07/25 10:55   Pulse 71 04/07/25 11:19   Resp 16 04/07/25 11:19   SpO2 96 % 04/07/25 11:19   Vitals shown include unfiled device data.      Event Time   Out of Recovery 11:15:00         Pain/Jami Score: Jami Score: 10 (4/7/2025 11:15 AM)

## 2025-04-07 NOTE — PLAN OF CARE
Pt arrived to recovery dosc via stretcher per GI team. Bedside report received. Pt attached to bedside monitor. VSS. Pt _sedated__ post procedure. Pt on room air oxygen; sats _97___%. Pt IV access infusing NS. Will continue to monitor.

## 2025-04-07 NOTE — H&P
Short Stay Endoscopy History and Physical    PCP - Suzie Reyez MD    Procedure - Colonoscopy  ASA - per anesthesia  Mallampati - per anesthesia  History of Anesthesia problems - no  Family history Anesthesia problems -  no   Plan of anesthesia - General    HPI:  This is a 55 y.o. male here for evaluation of : CRC screening      Medical History:  has no past medical history on file.    Surgical History:  has a past surgical history that includes Percutaneous nephrolithotomy (Right, 8/21/2023); cystoureteroscopy,with holmium laser lithotripsy of ureteral calculus (Right, 8/21/2023); extraction - stone (Right, 8/21/2023); and Percutaneous replacement of nephrostomy tube (Right, 8/21/2023).    Family History: family history is not on file.    Social History:  reports that he has never smoked. He has never used smokeless tobacco. He reports that he does not currently use alcohol. He reports that he does not use drugs.    Review of patient's allergies indicates:  No Known Allergies    Medications:   Prescriptions Prior to Admission[1]      Physical Exam:    Vital Signs: There were no vitals filed for this visit.    General Appearance: Well appearing in no acute distress  Head: Normocephalic, without obvious abnormality   Lungs: Non-labored breathing  Abdomen: Soft, non tender, non distended     Labs:  Lab Results   Component Value Date    WBC 7.20 11/28/2023    HGB 14.0 11/28/2023    HCT 41.5 11/28/2023     11/28/2023    ALT 20 11/28/2023    AST 20 11/28/2023     11/28/2023    K 4.6 11/28/2023     11/28/2023    CREATININE 0.9 11/28/2023    BUN 17 11/28/2023    CO2 25 11/28/2023    INR 1.0 11/28/2023       I have explained the risks and benefits of endoscopy procedures to the patient including but not limited to bleeding, perforation, infection, and death.  The patient was asked if they understand and allowed to ask any further questions to their satisfaction.    Martin Young MD        [1]    Medications Prior to Admission   Medication Sig Dispense Refill Last Dose/Taking    losartan (COZAAR) 25 MG tablet Take 25 mg by mouth every evening.       metFORMIN (GLUCOPHAGE) 500 MG tablet Take 500 mg by mouth 2 (two) times daily.       rosuvastatin (CRESTOR) 10 MG tablet Take 10 mg by mouth.

## 2025-04-07 NOTE — PROVATION PATIENT INSTRUCTIONS
Discharge Summary/Instructions after an Endoscopic Procedure  Patient Name: Tavo Chadwick  Patient MRN: [id number]  Patient YOB: 1970 Monday, April 7, 2025  Martin Young MD  Dear patient,  As a result of recent federal legislation (The Federal Cures Act), you may   receive lab or pathology results from your procedure in your [facility]   account before your physician is able to contact you. Your physician or   their representative will relay the results to you with their   recommendations at their soonest availability.  Thank you,  RESTRICTIONS:  During your procedure today, you received medications for sedation.  These   medications may affect your judgment, balance and coordination.  Therefore,   for 24 hours, you have the following restrictions:   - DO NOT drive a car, operate machinery, make legal/financial decisions,   sign important papers or drink alcohol.    ACTIVITY:  Today: no heavy lifting, straining or running due to procedural   sedation/anesthesia.  The following day: return to full activity including work.  DIET:  Eat and drink normally unless instructed otherwise.     TREATMENT FOR COMMON SIDE EFFECTS:  - Mild abdominal pain, nausea, belching, bloating or excessive gas:  rest,   eat lightly and use a heating pad.  - Sore Throat: treat with throat lozenges and/or gargle with warm salt   water.  - Because air was used during the procedure, expelling large amounts of air   from your rectum or belching is normal.  - If a bowel prep was taken, you may not have a bowel movement for 1-3 days.    This is normal.  SYMPTOMS TO WATCH FOR AND REPORT TO YOUR PHYSICIAN:  1. Abdominal pain or bloating, other than gas cramps.  2. Chest pain.  3. Back pain.  4. Signs of infection such as: chills or fever occurring within 24 hours   after the procedure.  5. Rectal bleeding, which would show as bright red, maroon, or black stools.   (A tablespoon of blood from the rectum is not serious, especially if    hemorrhoids are present.)  6. Vomiting.  7. Weakness or dizziness.  GO DIRECTLY TO THE NEAREST EMERGENCY ROOM IF YOU HAVE ANY OF THE FOLLOWING:      Difficulty breathing              Chills and/or fever over 101 F   Persistent vomiting and/or vomiting blood   Severe abdominal pain   Severe chest pain   Black, tarry stools   Bleeding- more than one tablespoon   Any other symptom or condition that you feel may need urgent attention  Your doctor recommends these additional instructions:  If any biopsies were taken, your doctors clinic will contact you in 1 to 2   weeks with any results.  - Discharge patient to home (ambulatory).   - Patient has a contact number available for emergencies.  The signs and   symptoms of potential delayed complications were discussed with the   patient.  Return to normal activities tomorrow.  Written discharge   instructions were provided to the patient.   - Resume previous diet.   - Continue present medications.   - Return to primary care physician as previously scheduled.   - Repeat colonoscopy in 3 years for surveillance. Final recommendation   pending review of pathology.  For questions, problems or results please call your physician - Martin Young MD at Work:  [phone].  [facility], EMERGENCY ROOM PHONE NUMBER: [phone]  IF A COMPLICATION OR EMERGENCY SITUATION ARISES AND YOU ARE UNABLE TO REACH   YOUR PHYSICIAN - GO DIRECTLY TO THE EMERGENCY ROOM.  MD Martin Meade MD  4/7/2025 11:04:33 AM  This report has been verified and signed electronically.  Dear patient,  As a result of recent federal legislation (The Federal Cures Act), you may   receive lab or pathology results from your procedure in your [facility]   account before your physician is able to contact you. Your physician or   their representative will relay the results to you with their   recommendations at their soonest availability.  Thank you,  [employer]ATION

## 2025-04-09 LAB
ESTROGEN SERPL-MCNC: NORMAL PG/ML
INSULIN SERPL-ACNC: NORMAL U[IU]/ML
LAB AP CLINICAL INFORMATION: NORMAL
LAB AP DIAGNOSIS CATEGORY: NORMAL
LAB AP GROSS DESCRIPTION: NORMAL
LAB AP PERFORMING LOCATION(S): NORMAL
LAB AP REPORT FOOTNOTES: NORMAL

## 2025-04-14 ENCOUNTER — RESULTS FOLLOW-UP (OUTPATIENT)
Dept: GASTROENTEROLOGY | Facility: HOSPITAL | Age: 55
End: 2025-04-14

## 2025-05-02 ENCOUNTER — OFFICE VISIT (OUTPATIENT)
Dept: NEUROLOGY | Facility: CLINIC | Age: 55
End: 2025-05-02
Payer: COMMERCIAL

## 2025-05-02 VITALS
WEIGHT: 216 LBS | DIASTOLIC BLOOD PRESSURE: 85 MMHG | HEART RATE: 75 BPM | SYSTOLIC BLOOD PRESSURE: 135 MMHG | BODY MASS INDEX: 30.13 KG/M2

## 2025-05-02 DIAGNOSIS — R55 SYNCOPE, UNSPECIFIED SYNCOPE TYPE: Primary | ICD-10-CM

## 2025-05-02 PROCEDURE — 99999 PR PBB SHADOW E&M-EST. PATIENT-LVL III: CPT | Mod: PBBFAC,,, | Performed by: STUDENT IN AN ORGANIZED HEALTH CARE EDUCATION/TRAINING PROGRAM

## 2025-05-02 NOTE — PROGRESS NOTES
GENERAL NEUROLOGY VISIT   Date: 5/2/25  Patient Name: Tavo Chadwick   MRN: [id number]   PCP: Suzie Reyez  Referring Provider: Suzie Reyez MD    History:    Patient is a 55 y.o.  male who was referred by for reported LOC.  PMH of DM, THN, HLD.      Patient presented with complaint of episodes of lost consciousness, currently happened twice, reported after coughing or eating, lasted few sec then back to normal.  No aura or postictal.  Reported episode like this 10 years ago while he was working, reported workup was normal at the time.     Also patient reported dyspnea or lying down and feeling windy with walking to the bathroom at night.   Patient also reported snoring while asleep, feeling tired during the day.     Patient following with outside PCP, no to chart available to review what has been done.       Past Surgical History:   Procedure Laterality Date    COLONOSCOPY, SCREENING, LOW RISK PATIENT N/A 4/7/2025    Procedure: COLONOSCOPY, SCREENING, LOW RISK PATIENT;  Surgeon: Martin Young MD;  Location: [facility] ENDOSCOPY;  Service: Endoscopy;  Laterality: N/A;  outside ref from Dr.Quynh Reyez/ [facility] inst portal-RB  2/26 pt r/s d/t holiday/ Suprep already sent in/portal SW  3/31 Precall attempted;LVM for patient to confirm;MB  3/31 Patient confirmed;MB    CYSTOURETEROSCOPY,WITH HOLMIUM LASER LITHOTRIPSY OF URETERAL CALCULUS Right 8/21/2023    Procedure: CYSTOURETEROSCOPY,WITH HOLMIUM LASER LITHOTRIPSY OF URETERAL CALCULUS;  Surgeon: Thong Ivey MD;  Location: [facility] OR;  Service: Urology;  Laterality: Right;    EXTRACTION - STONE Right 8/21/2023    Procedure: EXTRACTION - STONE;  Surgeon: Thogn Ivey MD;  Location: [facility] OR;  Service: Urology;  Laterality: Right;    PERCUTANEOUS NEPHROLITHOTOMY Right 8/21/2023    Procedure: NEPHROLITHOTOMY, PERCUTANEOUS AND HOMIUM LASER LITHOTRIPSY  W/STONE BASKET EXTRACTIONNEPHROSTOGRAM, DOUBLE J URETERAL STENT PLACEMENT; REMOVAL AND REPLACEMENT OF RIGHT NEPHROSTOMY  TUBE ANTEGRADE URETEROSCOPY;  Surgeon: Thong Ivey MD;  Location: [facility] OR;  Service: Urology;  Laterality: Right;  start 1200, after IR access in AM; need laser/shockpulse in room/C-Arm, etc.  changes     PERCUTANEOUS REPLACEMENT OF NEPHROSTOMY TUBE Right 8/21/2023    Procedure: REPLACEMENT, NEPHROSTOMY TUBE, PERCUTANEOUS;  Surgeon: Thong Ivey MD;  Location: [facility] OR;  Service: Urology;  Laterality: Right;       Social History[1]    Review of patient's allergies indicates:  No Known Allergies    Medications Ordered Prior to Encounter[2]     Family history:  No family history on file.    Review Of Systems     Constitutional Negative for fevers, chills, weigh loss   HEENT Negative for hearing loss, dysphagia, sore throat, diplopia   Respiratory Negative for shortness of breath, cough    Cardiovascular Negative for chest pain, palpitations    Gastrointestinal Negative for constipation, diarrhea, early satiety    Skin Negative for rashes    Musculoskeletal Negative for joint pains, myalgias.   Neurological See Above    Psychological Negative for sleep disturbances.    Heme/Lymph Negative for easy bruising, easy bleeding    Endocrine Negative for polyuria, polydypsia     Physical Exam:     Physical Examination    Vitals: /85 (BP Location: Right arm, Patient Position: Sitting)   Pulse 75   Wt 98 kg (216 lb)   BMI 30.13 kg/m²       NEURO    Neurological Exam  Mental Status:  Alert and oriented to person, place and time, recent and remote memory intact, normal attention span and fund of knowledge; Speech is spontaneous and fluent     Cranial Nerve exam:  II: Visual fields full to confrontation; Pupils equal round and reactive about 3mm  III, IV, VI: Extraoccular movements intact with no nystagmus  V: Sensation in V1, V2, V3 intact to light-touch bilaterally,  VII:  No facial weakness,  VIII: Hearing grossly intact  IX,X: palate elevation symmetric   XI: SCM & Trapezius normal,  XII: tongue  midline, normal morphology, tongue movement normal     Motor Exam: No involuntary movement. Normal tone and bulk in all 4 extremities  Strength: 5/5 throughout   Reflexes: 2+ throughout    Sensory Exam: Intact touch, pain and vibration in all 4 limbs    Cerebellar Sign: Normal Finger-to-nose,  bilaterally.  Gait:  Normal steady physiologic gait with normal arm swinging on both side     Interval/Previous Work-up:   Reviewed      Assessment and Plan:   Tavo Chadwick is a 55 y.o. male presenting 2 episodes of brief loss of consciousness with cough and /or after a meal.  No aura or postictal, reported dyspnea laying down and shortness of breath with walking short distance.  Also reported snoring while asleep.  Neurology exam unremarkable    Less likely symptoms related to neurological problem like central etiology or seizure, symptoms suggest syncopal episode however we will send for head and neck vessels for evaluation.       Problem List Items Addressed This Visit    None  Visit Diagnoses         Syncope, unspecified syncope type    -  Primary    Relevant Orders    MRA Brain without contrast    MRA Neck with and without contrast                     -- Recommend Cardiology evaluation and sleep evaluation         -- If the vessels imaging is normal, no further neurological workup needed at this time      Time spent on this encounter: 40 minutes. This includes face to face time and non-face to face time preparing to see the patient (eg, review of tests), obtaining and/or reviewing separately obtained history, documenting clinical information in the electronic or other health record, independently interpreting results and communicating results to the patient/family/caregiver, or care coordinator.       A dictation device was used to produce this document. Use of such devices sometimes results in grammatical errors or replacement of words that sound similarly.     Felicia Murray MD, M.B.Ch.B  Neurology, Vascular  neurology  [facility]         [1]   Social History  Socioeconomic History    Marital status:    Tobacco Use    Smoking status: Never    Smokeless tobacco: Never   Substance and Sexual Activity    Alcohol use: Not Currently    Drug use: Never   [2]   Current Outpatient Medications on File Prior to Visit   Medication Sig Dispense Refill    losartan (COZAAR) 25 MG tablet Take 25 mg by mouth every evening.      metFORMIN (GLUCOPHAGE) 500 MG tablet Take 500 mg by mouth 2 (two) times daily.      rosuvastatin (CRESTOR) 10 MG tablet Take 10 mg by mouth.       No current facility-administered medications on file prior to visit.

## 2025-05-04 NOTE — PROGRESS NOTES
[facility] [address]     SUBJECTIVE:     History of Present Illness:  Patient is a 55 y.o. male presents with syncope 10 days ago. Saw neurology 5 days ago. Had breakfast late that day around 12 noon; was lying down in the bed; felt a pain on the right side of neck at the base; and passed out. He told his wife his neck hurt and he could not breath and passed out. Eyes rolled back and tongue outside per wife. This was 15 seconds; came to and felt dizzy. [place] buzzing in his ears. Drank water and walked in the house. [place] dizzy. Since then, has had some difficulty breathing especially while lying down on the left side. Now back to normal . About 3 or 4 months ago, ate and was in the chair and passed out     Primary Diagnosis:   Hypertension: positive  DM: prediabetic  Smoker: none  Family history of early CAD: Dad with MI 60's  Heart disease : none   ROS  No chest pains  Prior to this episode, no shortness of breath; no PND or orthopnea  Snores a lot  No diagnosis of sleep apnea yet  No palpitations  Activity: walks in the house;  in store; walks in the store   Blood pressures at home normal   Review of patient's allergies indicates:  No Known Allergies    No past medical history on file.    Past Surgical History:   Procedure Laterality Date    COLONOSCOPY, SCREENING, LOW RISK PATIENT N/A 4/7/2025    Procedure: COLONOSCOPY, SCREENING, LOW RISK PATIENT;  Surgeon: Martin Young MD;  Location: [facility] ENDOSCOPY;  Service: Endoscopy;  Laterality: N/A;  outside ref from Dr.Quynh Reyez/ yumiko inst portal-RB  2/26 pt r/s d/t holiday/ Suprep already sent in/portal SW  3/31 Precall attempted;LVM for patient to confirm;MB  3/31 Patient confirmed;MB    CYSTOURETEROSCOPY,WITH HOLMIUM LASER LITHOTRIPSY OF URETERAL CALCULUS Right 8/21/2023    Procedure: CYSTOURETEROSCOPY,WITH HOLMIUM LASER LITHOTRIPSY OF URETERAL CALCULUS;  Surgeon: Thong Ivey MD;  Location: [facility] OR;  Service: Urology;  Laterality: Right;    EXTRACTION -  "STONE Right 2023    Procedure: EXTRACTION - STONE;  Surgeon: Thong Ivey MD;  Location: [facility] OR;  Service: Urology;  Laterality: Right;    PERCUTANEOUS NEPHROLITHOTOMY Right 2023    Procedure: NEPHROLITHOTOMY, PERCUTANEOUS AND HOMIUM LASER LITHOTRIPSY  W/STONE BASKET EXTRACTIONNEPHROSTOGRAM, DOUBLE J URETERAL STENT PLACEMENT; REMOVAL AND REPLACEMENT OF RIGHT NEPHROSTOMY TUBE ANTEGRADE URETEROSCOPY;  Surgeon: Thong Ivey MD;  Location: [facility] OR;  Service: Urology;  Laterality: Right;  start 1200, after IR access in AM; need laser/shockpulse in room/C-Arm, etc.  changes     PERCUTANEOUS REPLACEMENT OF NEPHROSTOMY TUBE Right 2023    Procedure: REPLACEMENT, NEPHROSTOMY TUBE, PERCUTANEOUS;  Surgeon: Thong Ivey MD;  Location: [facility] OR;  Service: Urology;  Laterality: Right;           Past Hospitalization:         Cardiac meds:  Losartan 25 mg  Rosuvastatin 10 mg  Metformin 500 mg         OBJECTIVE:     Vital Signs (Most Recent)  Vitals:    25 0853   BP: 130/79   Pulse: 74   SpO2: 96%   Weight: 98.1 kg (216 lb 6.1 oz)   Height: 5' 11" (1.803 m)         Physical Exam:  Neck: normal carotids, no bruits; normal JVP  Lungs :clear  Heart: RR, normal S1,S2, no murmurs, no gallops  Abd: no masses; no bruits;   Exts: normal DP and PT pulses bilaterally, normal radials; no edema noted               Labs  A1c 6.2?     Diagnostic Results:    1.EK/23: normal   2.Echo:   3. Stress test  4. cath    Chart review:    Neruology evaluation: : deemed not the cause     ASSESSMENT/PLAN:     Episode of syncope 10 days ago: no evidence of valvular heart disease; no substrate for arrhythmias; will check EF which I expect to be normal - feel this could have been apnea from SERGIO - definitely needs sleep study and possible CPAP  Blood pressures normal  Episode of syncope months ago most likely vasovagal    Plan: 1. Echocardiogram  2. 30 day event monitor  3. Sleep study - to be ordered by primary "   4. Return after in 3 months or earlier    Ike Vicente MD

## 2025-05-07 ENCOUNTER — OFFICE VISIT (OUTPATIENT)
Dept: CARDIOLOGY | Facility: CLINIC | Age: 55
End: 2025-05-07
Payer: COMMERCIAL

## 2025-05-07 VITALS
WEIGHT: 216.38 LBS | HEART RATE: 74 BPM | HEIGHT: 71 IN | DIASTOLIC BLOOD PRESSURE: 79 MMHG | OXYGEN SATURATION: 96 % | SYSTOLIC BLOOD PRESSURE: 130 MMHG | BODY MASS INDEX: 30.29 KG/M2

## 2025-05-07 DIAGNOSIS — I45.9 STOKES-ADAMS SYNCOPE: Primary | ICD-10-CM

## 2025-05-07 DIAGNOSIS — I10 PRIMARY HYPERTENSION: ICD-10-CM

## 2025-05-07 PROCEDURE — 99999 PR PBB SHADOW E&M-EST. PATIENT-LVL III: CPT | Mod: PBBFAC,,, | Performed by: INTERNAL MEDICINE

## 2025-05-07 PROCEDURE — 3075F SYST BP GE 130 - 139MM HG: CPT | Mod: CPTII,S$GLB,, | Performed by: INTERNAL MEDICINE

## 2025-05-07 PROCEDURE — 4010F ACE/ARB THERAPY RXD/TAKEN: CPT | Mod: CPTII,S$GLB,, | Performed by: INTERNAL MEDICINE

## 2025-05-07 PROCEDURE — 99204 OFFICE O/P NEW MOD 45 MIN: CPT | Mod: S$GLB,,, | Performed by: INTERNAL MEDICINE

## 2025-05-07 PROCEDURE — 1159F MED LIST DOCD IN RCRD: CPT | Mod: CPTII,S$GLB,, | Performed by: INTERNAL MEDICINE

## 2025-05-07 PROCEDURE — 3008F BODY MASS INDEX DOCD: CPT | Mod: CPTII,S$GLB,, | Performed by: INTERNAL MEDICINE

## 2025-05-07 PROCEDURE — 1160F RVW MEDS BY RX/DR IN RCRD: CPT | Mod: CPTII,S$GLB,, | Performed by: INTERNAL MEDICINE

## 2025-05-07 PROCEDURE — 3078F DIAST BP <80 MM HG: CPT | Mod: CPTII,S$GLB,, | Performed by: INTERNAL MEDICINE

## 2025-05-10 ENCOUNTER — HOSPITAL ENCOUNTER (OUTPATIENT)
Dept: RADIOLOGY | Facility: HOSPITAL | Age: 55
Discharge: HOME | End: 2025-05-10
Attending: STUDENT IN AN ORGANIZED HEALTH CARE EDUCATION/TRAINING PROGRAM
Payer: COMMERCIAL

## 2025-05-10 DIAGNOSIS — R55 SYNCOPE, UNSPECIFIED SYNCOPE TYPE: ICD-10-CM

## 2025-05-10 PROCEDURE — A9585 GADOBUTROL INJECTION: HCPCS | Performed by: STUDENT IN AN ORGANIZED HEALTH CARE EDUCATION/TRAINING PROGRAM

## 2025-05-10 PROCEDURE — 70544 MR ANGIOGRAPHY HEAD W/O DYE: CPT | Mod: 26,,, | Performed by: RADIOLOGY

## 2025-05-10 PROCEDURE — 70544 MR ANGIOGRAPHY HEAD W/O DYE: CPT | Mod: TC

## 2025-05-10 PROCEDURE — 70549 MR ANGIOGRAPH NECK W/O&W/DYE: CPT | Mod: TC

## 2025-05-10 PROCEDURE — 25500020 PHARM REV CODE 255: Performed by: STUDENT IN AN ORGANIZED HEALTH CARE EDUCATION/TRAINING PROGRAM

## 2025-05-10 RX ORDER — GADOBUTROL 604.72 MG/ML
10 INJECTION INTRAVENOUS
Status: COMPLETED | OUTPATIENT
Start: 2025-05-10 | End: 2025-05-10

## 2025-05-10 RX ADMIN — GADOBUTROL 10 ML: 604.72 INJECTION INTRAVENOUS at 07:05

## 2025-05-12 DIAGNOSIS — R55 SYNCOPE AND COLLAPSE: Primary | ICD-10-CM

## 2025-05-13 ENCOUNTER — RESULTS FOLLOW-UP (OUTPATIENT)
Dept: NEUROLOGY | Facility: CLINIC | Age: 55
End: 2025-05-13

## 2025-05-28 ENCOUNTER — HOSPITAL ENCOUNTER (OUTPATIENT)
Dept: CARDIOLOGY | Facility: HOSPITAL | Age: 55
Discharge: HOME | End: 2025-05-28
Attending: INTERNAL MEDICINE
Payer: COMMERCIAL

## 2025-05-28 VITALS
HEIGHT: 71 IN | DIASTOLIC BLOOD PRESSURE: 70 MMHG | WEIGHT: 216 LBS | SYSTOLIC BLOOD PRESSURE: 118 MMHG | BODY MASS INDEX: 30.24 KG/M2 | HEART RATE: 80 BPM

## 2025-05-28 DIAGNOSIS — I45.9 STOKES-ADAMS SYNCOPE: ICD-10-CM

## 2025-05-28 LAB
AORTIC SIZE INDEX (SOV): 1.4 CM/M2
AORTIC SIZE INDEX: 1.5 CM/M2
ASCENDING AORTA: 3.2 CM
AV AREA BY CONTINUOUS VTI: 2.7 CM2
AV INDEX (PROSTH): 0.88
AV LVOT MEAN GRADIENT: 3 MMHG
AV LVOT PEAK GRADIENT: 6 MMHG
AV MEAN GRADIENT: 4 MMHG
AV PEAK GRADIENT: 7 MMHG
AV VALVE AREA BY VELOCITY RATIO: 3.1 CM²
AV VALVE AREA: 2.7 CM2
AV VELOCITY RATIO: 1
BSA FOR ECHO PROCEDURE: 2.22 M2
CV ECHO LV RWT: 0.37 CM
DOP CALC AO PEAK VEL: 1.3 M/S
DOP CALC AO VTI: 26.5 CM
DOP CALC LVOT AREA: 3.1 CM2
DOP CALC LVOT DIAMETER: 2 CM
DOP CALC LVOT PEAK VEL: 1.3 M/S
DOP CALC LVOT STROKE VOLUME: 72.8 CM3
DOP CALC RVOT AREA: 2.89 CM2
DOP CALC RVOT DIAMETER: 1.92 CM
DOP CALCLVOT PEAK VEL VTI: 23.2 CM
E WAVE DECELERATION TIME: 154 MS
E/A RATIO: 1.28
E/E' RATIO: 6 M/S
ECHO EF ESTIMATED: 67 %
ECHO LV POSTERIOR WALL: 0.7 CM (ref 0.6–1.1)
FRACTIONAL SHORTENING: 36.8 % (ref 28–44)
HR MV ECHO: 80 BPM
INTERVENTRICULAR SEPTUM: 0.7 CM (ref 0.6–1.1)
IVC DIAMETER: 1.75 CM
LA MAJOR: 3.9 CM
LA MINOR: 3.5 CM
LA WIDTH: 3.5 CM
LEFT ATRIUM SIZE: 2.9 CM
LEFT ATRIUM VOLUME INDEX MOD: 16 ML/M2
LEFT ATRIUM VOLUME INDEX: 15 ML/M2
LEFT ATRIUM VOLUME MOD: 35 ML
LEFT ATRIUM VOLUME: 32 CM3
LEFT INTERNAL DIMENSION IN SYSTOLE: 2.4 CM (ref 2.1–4)
LEFT VENTRICLE DIASTOLIC VOLUME INDEX: 28.9 ML/M2
LEFT VENTRICLE DIASTOLIC VOLUME: 63 ML
LEFT VENTRICLE MASS INDEX: 33 G/M2
LEFT VENTRICLE SYSTOLIC VOLUME INDEX: 9.6 ML/M2
LEFT VENTRICLE SYSTOLIC VOLUME: 21 ML
LEFT VENTRICULAR INTERNAL DIMENSION IN DIASTOLE: 3.8 CM (ref 3.5–6)
LEFT VENTRICULAR MASS: 71.9 G
LV LATERAL E/E' RATIO: 4.6
LV SEPTAL E/E' RATIO: 8
MV A" WAVE DURATION": 99.9 MS
MV MEAN GRADIENT: 1 MMHG
MV PEAK A VEL: 0.5 M/S
MV PEAK E VEL: 0.64 M/S
MV PEAK GRADIENT: 2 MMHG
OHS CV RV/LV RATIO: 0.71 CM
PISA TR MAX VEL: 2.7 M/S
PULM VEIN A" WAVE DURATION": 99.9 MS
PULM VEIN S/D RATIO: 1.62
PULMONIC VEIN PEAK A VELOCITY: 0.3 M/S
PV PEAK D VEL: 0.29 M/S
PV PEAK S VEL: 0.47 M/S
RA MAJOR: 3.94 CM
RA PRESSURE ESTIMATED: 3 MMHG
RA WIDTH: 3.01 CM
RIGHT ATRIAL AREA: 11.1 CM2
RIGHT VENTRICLE DIASTOLIC BASEL DIMENSION: 2.7 CM
RV TB RVSP: 6 MMHG
RV TISSUE DOPPLER FREE WALL SYSTOLIC VELOCITY 1 (APICAL 4 CHAMBER VIEW): 12.15 CM/S
SINUS: 3.06 CM
STJ: 3.2 CM
TDI LATERAL: 0.14 M/S
TDI SEPTAL: 0.08 M/S
TDI: 0.11 M/S
TRICUSPID ANNULAR PLANE SYSTOLIC EXCURSION: 1.9 CM
TV PEAK GRADIENT: 29 MMHG
TV REST PULMONARY ARTERY PRESSURE: 32 MMHG
Z-SCORE OF LEFT VENTRICULAR DIMENSION IN END DIASTOLE: -6.51
Z-SCORE OF LEFT VENTRICULAR DIMENSION IN END SYSTOLE: -4.85

## 2025-05-28 PROCEDURE — 93306 TTE W/DOPPLER COMPLETE: CPT

## 2025-05-28 PROCEDURE — 93306 TTE W/DOPPLER COMPLETE: CPT | Mod: 26,,, | Performed by: INTERNAL MEDICINE

## 2025-05-29 ENCOUNTER — RESULTS FOLLOW-UP (OUTPATIENT)
Dept: CARDIOLOGY | Facility: HOSPITAL | Age: 55
End: 2025-05-29

## 2025-06-23 ENCOUNTER — CLINICAL SUPPORT (OUTPATIENT)
Dept: CARDIOLOGY | Facility: HOSPITAL | Age: 55
End: 2025-06-23
Attending: INTERNAL MEDICINE
Payer: COMMERCIAL

## 2025-06-23 DIAGNOSIS — I45.9 STOKES-ADAMS SYNCOPE: ICD-10-CM

## 2025-08-11 ENCOUNTER — OFFICE VISIT (OUTPATIENT)
Dept: CARDIOLOGY | Facility: CLINIC | Age: 55
End: 2025-08-11
Payer: COMMERCIAL

## 2025-08-11 VITALS
WEIGHT: 214.94 LBS | SYSTOLIC BLOOD PRESSURE: 136 MMHG | HEART RATE: 75 BPM | DIASTOLIC BLOOD PRESSURE: 87 MMHG | OXYGEN SATURATION: 99 % | HEIGHT: 71 IN | BODY MASS INDEX: 30.09 KG/M2

## 2025-08-11 DIAGNOSIS — R55 SYNCOPE AND COLLAPSE: ICD-10-CM

## 2025-08-11 DIAGNOSIS — R55 SYNCOPE, UNSPECIFIED SYNCOPE TYPE: ICD-10-CM

## 2025-08-11 DIAGNOSIS — I10 PRIMARY HYPERTENSION: Primary | ICD-10-CM

## 2025-08-11 PROCEDURE — 99213 OFFICE O/P EST LOW 20 MIN: CPT | Mod: S$GLB,,, | Performed by: INTERNAL MEDICINE

## 2025-08-11 PROCEDURE — 3079F DIAST BP 80-89 MM HG: CPT | Mod: CPTII,S$GLB,, | Performed by: INTERNAL MEDICINE

## 2025-08-11 PROCEDURE — 1160F RVW MEDS BY RX/DR IN RCRD: CPT | Mod: CPTII,S$GLB,, | Performed by: INTERNAL MEDICINE

## 2025-08-11 PROCEDURE — 99999 PR PBB SHADOW E&M-EST. PATIENT-LVL III: CPT | Mod: PBBFAC,,, | Performed by: INTERNAL MEDICINE

## 2025-08-11 PROCEDURE — 3008F BODY MASS INDEX DOCD: CPT | Mod: CPTII,S$GLB,, | Performed by: INTERNAL MEDICINE

## 2025-08-11 PROCEDURE — 3075F SYST BP GE 130 - 139MM HG: CPT | Mod: CPTII,S$GLB,, | Performed by: INTERNAL MEDICINE

## 2025-08-11 PROCEDURE — 4010F ACE/ARB THERAPY RXD/TAKEN: CPT | Mod: CPTII,S$GLB,, | Performed by: INTERNAL MEDICINE

## 2025-08-11 PROCEDURE — 1159F MED LIST DOCD IN RCRD: CPT | Mod: CPTII,S$GLB,, | Performed by: INTERNAL MEDICINE

## (undated) DEVICE — SEAL UROLOGY

## (undated) DEVICE — SET IRR URLGY 2LINE UNIV SPIKE

## (undated) DEVICE — DRAPE C-ARM/MOBILE XRAY 44X80

## (undated) DEVICE — INFLATION DEVICE ENCORE 26

## (undated) DEVICE — SET DECANTER MEDICHOICE

## (undated) DEVICE — CHLORAPREP W TINT 26ML APPL

## (undated) DEVICE — SYR 10CC LUER LOCK

## (undated) DEVICE — DRESSING TRANS 4X4 3/4

## (undated) DEVICE — TUBING SUC UNIV W/CONN 12FT

## (undated) DEVICE — COVER TABLE HVY DTY 60X90IN

## (undated) DEVICE — PROBE SHOCK PULSE 3.76MM URO

## (undated) DEVICE — GAUZE AVANT SPNG 4PLY STRL 4X4

## (undated) DEVICE — BAG DRAIN URINARY CONT IRRG

## (undated) DEVICE — SYR 50ML CATH TIP

## (undated) DEVICE — DRAPE NEPHRSCPY SURG 72X118IN

## (undated) DEVICE — JELLY LUBRICANT STERILE 4 OZ

## (undated) DEVICE — DEVICE INFLATION 20ML

## (undated) DEVICE — Device

## (undated) DEVICE — SPRAY MASTISOL

## (undated) DEVICE — PAD ABDOMINAL 5X9 STERILE

## (undated) DEVICE — DRESSING TRANS 8X12 TEGADERM

## (undated) DEVICE — BLADE SURG CARBON STEEL SZ11

## (undated) DEVICE — GAUZE SPONGE 4X4 12PLY

## (undated) DEVICE — APPLICATOR CHLORAPREP ORN 26ML

## (undated) DEVICE — FIBER MOSES 200 DFL

## (undated) DEVICE — PLUG CATHETER STERILE FOLEY

## (undated) DEVICE — GOWN POLY REINF BRTH SLV LG

## (undated) DEVICE — PACK BASIC

## (undated) DEVICE — GUIDEWIRE NITINOL HYBRID 150CM

## (undated) DEVICE — GLOVE BIOGEL SKINSENSE PI 7.0

## (undated) DEVICE — SOL IRR NACL .9% 3000ML

## (undated) DEVICE — PACK SURGERY START

## (undated) DEVICE — EXTRACTOR TIPLESS 2.4FRX1115CM

## (undated) DEVICE — DRAPE THREE-QTR REINF 53X77IN

## (undated) DEVICE — WIRE GUIDE TFLN CT SPR STFF ST

## (undated) DEVICE — GOWN POLY REINF X-LONG 2XL

## (undated) DEVICE — TRAY FOLEY 16FR INFECTION CONT

## (undated) DEVICE — CATH URETERAL DUAL LUMEN 10FR

## (undated) DEVICE — GUIDE WIRE MOTION .035 X 150CM

## (undated) DEVICE — SUT 2/0 30IN SILK BLK BRAI